# Patient Record
Sex: MALE | Race: BLACK OR AFRICAN AMERICAN | NOT HISPANIC OR LATINO
[De-identification: names, ages, dates, MRNs, and addresses within clinical notes are randomized per-mention and may not be internally consistent; named-entity substitution may affect disease eponyms.]

---

## 2017-02-03 ENCOUNTER — MEDICATION RENEWAL (OUTPATIENT)
Age: 62
End: 2017-02-03

## 2017-02-08 ENCOUNTER — APPOINTMENT (OUTPATIENT)
Dept: ENDOCRINOLOGY | Facility: CLINIC | Age: 62
End: 2017-02-08

## 2017-02-08 VITALS
DIASTOLIC BLOOD PRESSURE: 80 MMHG | HEIGHT: 70 IN | OXYGEN SATURATION: 98 % | SYSTOLIC BLOOD PRESSURE: 120 MMHG | HEART RATE: 69 BPM | WEIGHT: 218 LBS | BODY MASS INDEX: 31.21 KG/M2

## 2017-02-08 DIAGNOSIS — E11.65 TYPE 2 DIABETES MELLITUS WITH HYPERGLYCEMIA: ICD-10-CM

## 2017-02-08 LAB
GLUCOSE BLDC GLUCOMTR-MCNC: 104
HBA1C MFR BLD HPLC: 6.9

## 2017-05-17 ENCOUNTER — APPOINTMENT (OUTPATIENT)
Dept: ENDOCRINOLOGY | Facility: CLINIC | Age: 62
End: 2017-05-17
Payer: MEDICARE

## 2017-05-17 VITALS
HEART RATE: 76 BPM | DIASTOLIC BLOOD PRESSURE: 76 MMHG | SYSTOLIC BLOOD PRESSURE: 118 MMHG | HEIGHT: 70 IN | OXYGEN SATURATION: 97 % | WEIGHT: 224 LBS | BODY MASS INDEX: 32.07 KG/M2

## 2017-05-17 PROCEDURE — 99215 OFFICE O/P EST HI 40 MIN: CPT

## 2017-05-17 RX ORDER — CALCIUM CARBONATE 260MG(650)
500 TABLET,CHEWABLE ORAL
Refills: 0 | Status: ACTIVE | COMMUNITY

## 2017-05-19 LAB
CREAT SPEC-SCNC: 225 MG/DL
MICROALBUMIN 24H UR DL<=1MG/L-MCNC: 9.8 MG/DL
MICROALBUMIN/CREAT 24H UR-RTO: 44

## 2017-05-24 ENCOUNTER — MEDICATION RENEWAL (OUTPATIENT)
Age: 62
End: 2017-05-24

## 2017-06-02 ENCOUNTER — MEDICATION RENEWAL (OUTPATIENT)
Age: 62
End: 2017-06-02

## 2017-06-08 ENCOUNTER — MEDICATION RENEWAL (OUTPATIENT)
Age: 62
End: 2017-06-08

## 2017-10-25 ENCOUNTER — APPOINTMENT (OUTPATIENT)
Dept: ENDOCRINOLOGY | Facility: CLINIC | Age: 62
End: 2017-10-25
Payer: MEDICARE

## 2017-10-25 VITALS
SYSTOLIC BLOOD PRESSURE: 120 MMHG | DIASTOLIC BLOOD PRESSURE: 70 MMHG | WEIGHT: 227 LBS | BODY MASS INDEX: 32.5 KG/M2 | HEIGHT: 70 IN | OXYGEN SATURATION: 97 % | HEART RATE: 78 BPM

## 2017-10-25 LAB
GLUCOSE BLDC GLUCOMTR-MCNC: 116
HBA1C MFR BLD HPLC: 6.9

## 2017-10-25 PROCEDURE — 82962 GLUCOSE BLOOD TEST: CPT

## 2017-10-25 PROCEDURE — 99215 OFFICE O/P EST HI 40 MIN: CPT | Mod: 25

## 2017-10-25 PROCEDURE — 83036 HEMOGLOBIN GLYCOSYLATED A1C: CPT | Mod: QW

## 2017-10-25 RX ORDER — BLOOD-GLUCOSE METER
W/DEVICE KIT MISCELLANEOUS
Qty: 1 | Refills: 0 | Status: ACTIVE | COMMUNITY
Start: 2017-10-25 | End: 1900-01-01

## 2017-10-26 LAB
CREAT SPEC-SCNC: 164 MG/DL
MICROALBUMIN 24H UR DL<=1MG/L-MCNC: 4.5 MG/DL
MICROALBUMIN/CREAT 24H UR-RTO: 27 MG/G

## 2017-11-16 ENCOUNTER — CLINICAL ADVICE (OUTPATIENT)
Age: 62
End: 2017-11-16

## 2017-11-29 ENCOUNTER — CLINICAL ADVICE (OUTPATIENT)
Age: 62
End: 2017-11-29

## 2017-12-05 ENCOUNTER — CLINICAL ADVICE (OUTPATIENT)
Age: 62
End: 2017-12-05

## 2017-12-07 ENCOUNTER — EMERGENCY (EMERGENCY)
Facility: HOSPITAL | Age: 62
LOS: 1 days | Discharge: HOME | End: 2017-12-07
Attending: EMERGENCY MEDICINE | Admitting: EMERGENCY MEDICINE
Payer: MEDICARE

## 2017-12-07 VITALS
DIASTOLIC BLOOD PRESSURE: 78 MMHG | HEART RATE: 104 BPM | TEMPERATURE: 98 F | OXYGEN SATURATION: 100 % | RESPIRATION RATE: 16 BRPM | SYSTOLIC BLOOD PRESSURE: 107 MMHG

## 2017-12-07 DIAGNOSIS — Z85.528 PERSONAL HISTORY OF OTHER MALIGNANT NEOPLASM OF KIDNEY: Chronic | ICD-10-CM

## 2017-12-07 PROCEDURE — 99285 EMERGENCY DEPT VISIT HI MDM: CPT | Mod: 25,GC

## 2017-12-07 NOTE — ED ADULT TRIAGE NOTE - CHIEF COMPLAINT QUOTE
pt. w/ hx. MS and DM c/o dizziness , loss of appetite , diaphoretic , n/v since Friday. Wife states pt. has been on a new DM medication Vyduren 2 mg once a week since october. Family member states he has been feeling very uncomfortable since Friday. Pt. appears uncomfortable , fs in triage 178. pt. w/ hx. MS and DM c/o dizziness , loss of appetite , diaphoretic , n/v since Friday. Wife states pt. has been on a new DM medication Vyduren 2 mg once a week since october. Family member states he has been feeling very uncomfortable since Friday. Pt. appears uncomfortable , fs in triage 178. Unable to obtain an oral temp, pt. feels cold to touch , very diaphoretic. 98.3 axillary in triage.

## 2017-12-08 VITALS
TEMPERATURE: 99 F | OXYGEN SATURATION: 100 % | SYSTOLIC BLOOD PRESSURE: 111 MMHG | DIASTOLIC BLOOD PRESSURE: 87 MMHG | HEART RATE: 86 BPM | RESPIRATION RATE: 16 BRPM

## 2017-12-08 LAB
ALBUMIN SERPL ELPH-MCNC: 3.5 G/DL — SIGNIFICANT CHANGE UP (ref 3.3–5)
ALBUMIN SERPL ELPH-MCNC: 4.1 G/DL — SIGNIFICANT CHANGE UP (ref 3.3–5)
ALP SERPL-CCNC: 47 U/L — SIGNIFICANT CHANGE UP (ref 40–120)
ALP SERPL-CCNC: 62 U/L — SIGNIFICANT CHANGE UP (ref 40–120)
ALT FLD-CCNC: 8 U/L — SIGNIFICANT CHANGE UP (ref 4–41)
ALT FLD-CCNC: 8 U/L — SIGNIFICANT CHANGE UP (ref 4–41)
ANISOCYTOSIS BLD QL: SLIGHT — SIGNIFICANT CHANGE UP
APPEARANCE UR: CLEAR — SIGNIFICANT CHANGE UP
AST SERPL-CCNC: 11 U/L — SIGNIFICANT CHANGE UP (ref 4–40)
AST SERPL-CCNC: 7 U/L — SIGNIFICANT CHANGE UP (ref 4–40)
B-OH-BUTYR SERPL-SCNC: 1.7 MMOL/L — HIGH (ref 0–0.4)
BASE EXCESS BLDV CALC-SCNC: -3.8 MMOL/L — SIGNIFICANT CHANGE UP
BASE EXCESS BLDV CALC-SCNC: 3 MMOL/L — SIGNIFICANT CHANGE UP
BASOPHILS # BLD AUTO: 0.03 K/UL — SIGNIFICANT CHANGE UP (ref 0–0.2)
BASOPHILS # BLD AUTO: 0.17 K/UL — SIGNIFICANT CHANGE UP (ref 0–0.2)
BASOPHILS NFR BLD AUTO: 0.1 % — SIGNIFICANT CHANGE UP (ref 0–2)
BASOPHILS NFR BLD AUTO: 0.9 % — SIGNIFICANT CHANGE UP (ref 0–2)
BASOPHILS NFR SPEC: 0 % — SIGNIFICANT CHANGE UP (ref 0–2)
BILIRUB SERPL-MCNC: 0.5 MG/DL — SIGNIFICANT CHANGE UP (ref 0.2–1.2)
BILIRUB SERPL-MCNC: 0.7 MG/DL — SIGNIFICANT CHANGE UP (ref 0.2–1.2)
BILIRUB UR-MCNC: NEGATIVE — SIGNIFICANT CHANGE UP
BLASTS # FLD: 0 % — SIGNIFICANT CHANGE UP (ref 0–0)
BLOOD GAS VENOUS - CREATININE: 1.09 MG/DL — SIGNIFICANT CHANGE UP (ref 0.5–1.3)
BLOOD GAS VENOUS - CREATININE: 1.64 MG/DL — HIGH (ref 0.5–1.3)
BLOOD UR QL VISUAL: NEGATIVE — SIGNIFICANT CHANGE UP
BUN SERPL-MCNC: 24 MG/DL — HIGH (ref 7–23)
BUN SERPL-MCNC: 27 MG/DL — HIGH (ref 7–23)
CALCIUM SERPL-MCNC: 7.6 MG/DL — LOW (ref 8.4–10.5)
CALCIUM SERPL-MCNC: 9.3 MG/DL — SIGNIFICANT CHANGE UP (ref 8.4–10.5)
CHLORIDE BLDV-SCNC: 106 MMOL/L — SIGNIFICANT CHANGE UP (ref 96–108)
CHLORIDE BLDV-SCNC: 97 MMOL/L — SIGNIFICANT CHANGE UP (ref 96–108)
CHLORIDE SERPL-SCNC: 104 MMOL/L — SIGNIFICANT CHANGE UP (ref 98–107)
CHLORIDE SERPL-SCNC: 92 MMOL/L — LOW (ref 98–107)
CO2 SERPL-SCNC: 20 MMOL/L — LOW (ref 22–31)
CO2 SERPL-SCNC: 27 MMOL/L — SIGNIFICANT CHANGE UP (ref 22–31)
COLOR SPEC: SIGNIFICANT CHANGE UP
CREAT SERPL-MCNC: 1.15 MG/DL — SIGNIFICANT CHANGE UP (ref 0.5–1.3)
CREAT SERPL-MCNC: 1.6 MG/DL — HIGH (ref 0.5–1.3)
EOSINOPHIL # BLD AUTO: 1.18 K/UL — HIGH (ref 0–0.5)
EOSINOPHIL # BLD AUTO: 1.89 K/UL — HIGH (ref 0–0.5)
EOSINOPHIL NFR BLD AUTO: 5.8 % — SIGNIFICANT CHANGE UP (ref 0–6)
EOSINOPHIL NFR BLD AUTO: 9.9 % — HIGH (ref 0–6)
EOSINOPHIL NFR FLD: 1.8 % — SIGNIFICANT CHANGE UP (ref 0–6)
GAS PNL BLDV: 133 MMOL/L — LOW (ref 136–146)
GAS PNL BLDV: 133 MMOL/L — LOW (ref 136–146)
GIANT PLATELETS BLD QL SMEAR: PRESENT — SIGNIFICANT CHANGE UP
GLUCOSE BLDV-MCNC: 125 — HIGH (ref 70–99)
GLUCOSE BLDV-MCNC: 193 — HIGH (ref 70–99)
GLUCOSE SERPL-MCNC: 122 MG/DL — HIGH (ref 70–99)
GLUCOSE SERPL-MCNC: 178 MG/DL — HIGH (ref 70–99)
GLUCOSE UR-MCNC: NEGATIVE — SIGNIFICANT CHANGE UP
HCO3 BLDV-SCNC: 21 MMOL/L — SIGNIFICANT CHANGE UP (ref 20–27)
HCO3 BLDV-SCNC: 24 MMOL/L — SIGNIFICANT CHANGE UP (ref 20–27)
HCT VFR BLD CALC: 45.3 % — SIGNIFICANT CHANGE UP (ref 39–50)
HCT VFR BLD CALC: 54.9 % — HIGH (ref 39–50)
HCT VFR BLDV CALC: 50.8 % — SIGNIFICANT CHANGE UP (ref 39–51)
HCT VFR BLDV CALC: 60.6 % — CRITICAL HIGH (ref 39–51)
HGB BLD-MCNC: 16.3 G/DL — SIGNIFICANT CHANGE UP (ref 13–17)
HGB BLD-MCNC: 19.3 G/DL — CRITICAL HIGH (ref 13–17)
HGB BLDV-MCNC: 16.6 G/DL — SIGNIFICANT CHANGE UP (ref 13–17)
HGB BLDV-MCNC: 19.9 G/DL — CRITICAL HIGH (ref 13–17)
HYALINE CASTS # UR AUTO: SIGNIFICANT CHANGE UP (ref 0–?)
IMM GRANULOCYTES # BLD AUTO: 1.03 # — SIGNIFICANT CHANGE UP
IMM GRANULOCYTES # BLD AUTO: 1.13 # — SIGNIFICANT CHANGE UP
IMM GRANULOCYTES NFR BLD AUTO: 5.4 % — HIGH (ref 0–1.5)
IMM GRANULOCYTES NFR BLD AUTO: 5.5 % — HIGH (ref 0–1.5)
KETONES UR-MCNC: HIGH
LACTATE BLDV-MCNC: 1.2 MMOL/L — SIGNIFICANT CHANGE UP (ref 0.5–2)
LACTATE BLDV-MCNC: 3.2 MMOL/L — HIGH (ref 0.5–2)
LACTATE SERPL-SCNC: 1.8 MMOL/L — SIGNIFICANT CHANGE UP (ref 0.5–2)
LEUKOCYTE ESTERASE UR-ACNC: NEGATIVE — SIGNIFICANT CHANGE UP
LIDOCAIN IGE QN: 18.3 U/L — SIGNIFICANT CHANGE UP (ref 7–60)
LYMPHOCYTES # BLD AUTO: 1.98 K/UL — SIGNIFICANT CHANGE UP (ref 1–3.3)
LYMPHOCYTES # BLD AUTO: 10.4 % — LOW (ref 13–44)
LYMPHOCYTES # BLD AUTO: 11.6 % — LOW (ref 13–44)
LYMPHOCYTES # BLD AUTO: 2.37 K/UL — SIGNIFICANT CHANGE UP (ref 1–3.3)
LYMPHOCYTES NFR SPEC AUTO: 9.1 % — LOW (ref 13–44)
MCHC RBC-ENTMCNC: 26.8 PG — LOW (ref 27–34)
MCHC RBC-ENTMCNC: 27.5 PG — SIGNIFICANT CHANGE UP (ref 27–34)
MCHC RBC-ENTMCNC: 35.2 % — SIGNIFICANT CHANGE UP (ref 32–36)
MCHC RBC-ENTMCNC: 36 % — SIGNIFICANT CHANGE UP (ref 32–36)
MCV RBC AUTO: 76.4 FL — LOW (ref 80–100)
MCV RBC AUTO: 76.4 FL — LOW (ref 80–100)
METAMYELOCYTES # FLD: 0 % — SIGNIFICANT CHANGE UP (ref 0–1)
MICROCYTES BLD QL: SLIGHT — SIGNIFICANT CHANGE UP
MONOCYTES # BLD AUTO: 1.84 K/UL — HIGH (ref 0–0.9)
MONOCYTES # BLD AUTO: 2.11 K/UL — HIGH (ref 0–0.9)
MONOCYTES NFR BLD AUTO: 11.1 % — SIGNIFICANT CHANGE UP (ref 2–14)
MONOCYTES NFR BLD AUTO: 9 % — SIGNIFICANT CHANGE UP (ref 2–14)
MONOCYTES NFR BLD: 4.5 % — SIGNIFICANT CHANGE UP (ref 2–9)
MUCOUS THREADS # UR AUTO: SIGNIFICANT CHANGE UP
MYELOCYTES NFR BLD: 0 % — SIGNIFICANT CHANGE UP (ref 0–0)
NEUTROPHIL AB SER-ACNC: 68.2 % — SIGNIFICANT CHANGE UP (ref 43–77)
NEUTROPHILS # BLD AUTO: 11.91 K/UL — HIGH (ref 1.8–7.4)
NEUTROPHILS # BLD AUTO: 13.85 K/UL — HIGH (ref 1.8–7.4)
NEUTROPHILS NFR BLD AUTO: 62.3 % — SIGNIFICANT CHANGE UP (ref 43–77)
NEUTROPHILS NFR BLD AUTO: 68 % — SIGNIFICANT CHANGE UP (ref 43–77)
NEUTS BAND # BLD: 14.6 % — HIGH (ref 0–6)
NITRITE UR-MCNC: NEGATIVE — SIGNIFICANT CHANGE UP
NON-SQ EPI CELLS # UR AUTO: <1 — SIGNIFICANT CHANGE UP
NRBC # FLD: 0 — SIGNIFICANT CHANGE UP
NRBC # FLD: 0 — SIGNIFICANT CHANGE UP
OTHER - HEMATOLOGY %: 0 — SIGNIFICANT CHANGE UP
PCO2 BLDV: 34 MMHG — LOW (ref 41–51)
PCO2 BLDV: 47 MMHG — SIGNIFICANT CHANGE UP (ref 41–51)
PH BLDV: 7.39 PH — SIGNIFICANT CHANGE UP (ref 7.32–7.43)
PH BLDV: 7.4 PH — SIGNIFICANT CHANGE UP (ref 7.32–7.43)
PH UR: 5.5 — SIGNIFICANT CHANGE UP (ref 4.6–8)
PLATELET # BLD AUTO: 272 K/UL — SIGNIFICANT CHANGE UP (ref 150–400)
PLATELET # BLD AUTO: 344 K/UL — SIGNIFICANT CHANGE UP (ref 150–400)
PLATELET COUNT - ESTIMATE: NORMAL — SIGNIFICANT CHANGE UP
PMV BLD: 10.1 FL — SIGNIFICANT CHANGE UP (ref 7–13)
PMV BLD: 9.9 FL — SIGNIFICANT CHANGE UP (ref 7–13)
PO2 BLDV: 52 MMHG — HIGH (ref 35–40)
PO2 BLDV: < 24 MMHG — LOW (ref 35–40)
POLYCHROMASIA BLD QL SMEAR: SIGNIFICANT CHANGE UP
POTASSIUM BLDV-SCNC: 3.9 MMOL/L — SIGNIFICANT CHANGE UP (ref 3.4–4.5)
POTASSIUM BLDV-SCNC: 4.3 MMOL/L — SIGNIFICANT CHANGE UP (ref 3.4–4.5)
POTASSIUM SERPL-MCNC: 4.1 MMOL/L — SIGNIFICANT CHANGE UP (ref 3.5–5.3)
POTASSIUM SERPL-MCNC: 4.7 MMOL/L — SIGNIFICANT CHANGE UP (ref 3.5–5.3)
POTASSIUM SERPL-SCNC: 4.1 MMOL/L — SIGNIFICANT CHANGE UP (ref 3.5–5.3)
POTASSIUM SERPL-SCNC: 4.7 MMOL/L — SIGNIFICANT CHANGE UP (ref 3.5–5.3)
PROMYELOCYTES # FLD: 0 % — SIGNIFICANT CHANGE UP (ref 0–0)
PROT SERPL-MCNC: 6 G/DL — SIGNIFICANT CHANGE UP (ref 6–8.3)
PROT SERPL-MCNC: 7.4 G/DL — SIGNIFICANT CHANGE UP (ref 6–8.3)
PROT UR-MCNC: 30 MG/DL — HIGH
RBC # BLD: 5.93 M/UL — HIGH (ref 4.2–5.8)
RBC # BLD: 7.19 M/UL — HIGH (ref 4.2–5.8)
RBC # FLD: 15.1 % — HIGH (ref 10.3–14.5)
RBC # FLD: 17.1 % — HIGH (ref 10.3–14.5)
RBC CASTS # UR COMP ASSIST: SIGNIFICANT CHANGE UP (ref 0–?)
SAO2 % BLDV: 15.5 % — LOW (ref 60–85)
SAO2 % BLDV: 84.4 % — SIGNIFICANT CHANGE UP (ref 60–85)
SODIUM SERPL-SCNC: 135 MMOL/L — SIGNIFICANT CHANGE UP (ref 135–145)
SODIUM SERPL-SCNC: 138 MMOL/L — SIGNIFICANT CHANGE UP (ref 135–145)
SP GR SPEC: 1.03 — SIGNIFICANT CHANGE UP (ref 1–1.04)
SQUAMOUS # UR AUTO: SIGNIFICANT CHANGE UP
UROBILINOGEN FLD QL: NORMAL MG/DL — SIGNIFICANT CHANGE UP
VARIANT LYMPHS # BLD: 1.8 % — SIGNIFICANT CHANGE UP
WBC # BLD: 19.09 K/UL — HIGH (ref 3.8–10.5)
WBC # BLD: 20.4 K/UL — HIGH (ref 3.8–10.5)
WBC # FLD AUTO: 19.09 K/UL — HIGH (ref 3.8–10.5)
WBC # FLD AUTO: 20.4 K/UL — HIGH (ref 3.8–10.5)
WBC UR QL: HIGH (ref 0–?)

## 2017-12-08 PROCEDURE — 71020: CPT | Mod: 26

## 2017-12-08 PROCEDURE — 99218: CPT

## 2017-12-08 RX ORDER — BACLOFEN 100 %
10 POWDER (GRAM) MISCELLANEOUS
Qty: 0 | Refills: 0 | Status: DISCONTINUED | OUTPATIENT
Start: 2017-12-08 | End: 2017-12-11

## 2017-12-08 RX ORDER — SODIUM CHLORIDE 9 MG/ML
1000 INJECTION INTRAMUSCULAR; INTRAVENOUS; SUBCUTANEOUS ONCE
Qty: 0 | Refills: 0 | Status: COMPLETED | OUTPATIENT
Start: 2017-12-08 | End: 2017-12-08

## 2017-12-08 RX ORDER — SODIUM CHLORIDE 9 MG/ML
2000 INJECTION INTRAMUSCULAR; INTRAVENOUS; SUBCUTANEOUS ONCE
Qty: 0 | Refills: 0 | Status: COMPLETED | OUTPATIENT
Start: 2017-12-08 | End: 2017-12-08

## 2017-12-08 RX ORDER — SODIUM CHLORIDE 9 MG/ML
1000 INJECTION INTRAMUSCULAR; INTRAVENOUS; SUBCUTANEOUS
Qty: 0 | Refills: 0 | Status: DISCONTINUED | OUTPATIENT
Start: 2017-12-08 | End: 2017-12-11

## 2017-12-08 RX ORDER — FAMOTIDINE 10 MG/ML
20 INJECTION INTRAVENOUS ONCE
Qty: 0 | Refills: 0 | Status: COMPLETED | OUTPATIENT
Start: 2017-12-08 | End: 2017-12-08

## 2017-12-08 RX ORDER — LEVOTHYROXINE SODIUM 125 MCG
100 TABLET ORAL DAILY
Qty: 0 | Refills: 0 | Status: DISCONTINUED | OUTPATIENT
Start: 2017-12-08 | End: 2017-12-11

## 2017-12-08 RX ORDER — LOPERAMIDE HCL 2 MG
2 TABLET ORAL ONCE
Qty: 0 | Refills: 0 | Status: COMPLETED | OUTPATIENT
Start: 2017-12-08 | End: 2017-12-08

## 2017-12-08 RX ORDER — ONDANSETRON 8 MG/1
4 TABLET, FILM COATED ORAL ONCE
Qty: 0 | Refills: 0 | Status: COMPLETED | OUTPATIENT
Start: 2017-12-08 | End: 2017-12-08

## 2017-12-08 RX ORDER — VALSARTAN 80 MG/1
160 TABLET ORAL DAILY
Qty: 0 | Refills: 0 | Status: DISCONTINUED | OUTPATIENT
Start: 2017-12-08 | End: 2017-12-11

## 2017-12-08 RX ADMIN — Medication 10 MILLIGRAM(S): at 07:44

## 2017-12-08 RX ADMIN — SODIUM CHLORIDE 150 MILLILITER(S): 9 INJECTION INTRAMUSCULAR; INTRAVENOUS; SUBCUTANEOUS at 06:26

## 2017-12-08 RX ADMIN — Medication 100 MICROGRAM(S): at 07:43

## 2017-12-08 RX ADMIN — Medication 30 MILLILITER(S): at 03:11

## 2017-12-08 RX ADMIN — SODIUM CHLORIDE 1000 MILLILITER(S): 9 INJECTION INTRAMUSCULAR; INTRAVENOUS; SUBCUTANEOUS at 03:11

## 2017-12-08 RX ADMIN — FAMOTIDINE 20 MILLIGRAM(S): 10 INJECTION INTRAVENOUS at 03:11

## 2017-12-08 RX ADMIN — Medication 2 MILLIGRAM(S): at 10:45

## 2017-12-08 RX ADMIN — ONDANSETRON 4 MILLIGRAM(S): 8 TABLET, FILM COATED ORAL at 00:43

## 2017-12-08 RX ADMIN — VALSARTAN 160 MILLIGRAM(S): 80 TABLET ORAL at 07:43

## 2017-12-08 RX ADMIN — SODIUM CHLORIDE 1000 MILLILITER(S): 9 INJECTION INTRAMUSCULAR; INTRAVENOUS; SUBCUTANEOUS at 00:43

## 2017-12-08 NOTE — ED CDU PROVIDER INITIAL DAY NOTE - PROGRESS NOTE DETAILS
Pt doing well, feels much better, hemoconcentration improved. Pt had dc'ed his hypoglcemics due to vomiting, diarrhea and dec po intake. Will d/w Dr. Sanchez regarding restarting meds and then dc pt. Spoke with Dr. Sanchez from Endocrinology. Pt can be dc still holding PO meds and insulin. Pt to resume normal diabetic diet, when sugar starts to increase above 150 pt can call the office for follow up appointment. stable for dc

## 2017-12-08 NOTE — ED PROVIDER NOTE - PROGRESS NOTE DETAILS
Labs show likely hemoconcentration on CBC, no anion gap, + BHB, no acidosis, ketosis likely secondary to N/V/D. Pt feeling better following IV hydration and supportive medications, will observe for further treatment

## 2017-12-08 NOTE — ED CDU PROVIDER INITIAL DAY NOTE - OBJECTIVE STATEMENT
ED PROVIDER HPI: "62M h/o HTN, HLD, renal cell carcinoma, s/p resection, hypothyroid and MS presenting with diaphoresis.  Symptoms started 6 days ago, notes diaphoresis, chills, decreased appetite, nausea, vomiting (2-3x/day), diarrhea (watery, every other hour), periumbilical abd pain & distension. Tolerating fluids at home. Has not been taking insulin at home.  Endo: Rama Sanchez  PMD: Baskharoun  Meds Bydureon 2mg weekly, novolog 0-10-12, lantus 48u"    CDU LEXII Betancur: Agree with above history. Pt is a 62yM w/pmhx HTN, HLD, renal cell carcimona s/p resection, hypothyroid and MS presented to the ED with 6 days of diaphoresis, chills, abdominal pain, nausea, vomiting and diarrhea. Pt states 6 weeks ago he started a new DM medication, Bydureon, when symptoms started he saw his endocrinologist who told him to stop taking all of his DM medications. In the ED pt found to have blood sugar 168, normal bicarb, no gap, +beta-hydroxybutyrate, dx likely gastroenteritis.

## 2017-12-08 NOTE — ED ADULT NURSE NOTE - CHIEF COMPLAINT QUOTE
pt. w/ hx. MS and DM c/o dizziness , loss of appetite , diaphoretic , n/v since Friday. Wife states pt. has been on a new DM medication Vyduren 2 mg once a week since october. Family member states he has been feeling very uncomfortable since Friday. Pt. appears uncomfortable , fs in triage 178. Unable to obtain an oral temp, pt. feels cold to touch , very diaphoretic. 98.3 axillary in triage.

## 2017-12-08 NOTE — ED PROVIDER NOTE - PMH
Hyperlipidemia    Hypertension    Multiple sclerosis    Renal cell carcinoma of both kidneys    Scleritis, unspecified laterality

## 2017-12-08 NOTE — ED CDU PROVIDER DISPOSITION NOTE - ATTENDING CONTRIBUTION TO CARE
Dr. Ramirez: This H&P has been written by myself in its entirety  Dr. Ramirez: I performed a face to face bedside interview with patient regarding history of present illness, review of symptoms and past medical history. I completed an independent physical exam.  I have discussed patient's plan of care with PA.   I agree with note as stated above, having amended the EMR as needed to reflect my findings.   This includes HISTORY OF PRESENT ILLNESS, HIV, PAST MEDICAL/SURGICAL/FAMILY/SOCIAL HISTORY, ALLERGIES AND HOME MEDICATIONS, REVIEW OF SYSTEMS, PHYSICAL EXAM, and any PROGRESS NOTES during the time I functioned as the attending physician for this patient.

## 2017-12-08 NOTE — ED PROVIDER NOTE - ATTENDING CONTRIBUTION TO CARE
61 y/o M with h/o HTN, HLD, renal cell carcinoma s/p resection, hypothyroid, MS, DM here with 1 week of n/v/d, now with generalized weakness, diaphoresis.  Pt states that for the past week he has been having multiple (2-3 episodes) of nbnb vomiting and watery nonbloody diarrhea, accompanied by 61 y/o M with h/o HTN, HLD, renal cell carcinoma s/p resection, hypothyroid, MS, DM here with 1 week of n/v/d, now with generalized weakness, diaphoresis.  Pt states that for the past week he has been having multiple (2-3 episodes) of nbnb vomiting and watery nonbloody diarrhea, accompanied by poor appetite and occasional crampy abd pain.  No recent travel, hospitalizations, known sick contacts, abx use.  Pt states he thinks it's due to his diabetes medication (bydureon, started 7 weeks prior) and spoke to his endocrinologist Dr Adams, who told him to stop all his medications.  Pt cont to have sxs.  No fever, cp, back pain, sob, cough, urinary sxs, rash.  Well appearing, lying comfortably in stretcher, awake and alert, nontoxic.  VSS.  NCAT, dry mucous membranes.  Lungs cta bl.  Cards nl S1/S2, RRR, no MRG.  Abd soft ntnd with active bowel sounds.  No pedal edema or calf tenderness.  Concern for viral AGE vs med reaction vs hyperglycemia.  BHB elevated on labs, but pt without acidosis, normal AG, normal bicarb.  Labs reveal hemoconcentration, pt feeling better after IV hydration, will obs in CDU for cont hydration, repeat labs, endo eval regarding med regimen.

## 2017-12-08 NOTE — ED CDU PROVIDER INITIAL DAY NOTE - MEDICAL DECISION MAKING DETAILS
62yM w/pmhx HTN, HLD, renal cell carcinoma s/p resection, hypothyroid and MS presented to the ED with 6 days of abdominal discomfort, diaphoresis w/ vomiting and diarrhea. In the ED pt was given 2L, in CDU for repeat labs, IV hydration and endocrine consult

## 2017-12-08 NOTE — ED PROVIDER NOTE - OBJECTIVE STATEMENT
62M h/o HTN, HLD, renal cell carcinoma, s/p resection, hypothyroid and MS presenting with diaphoresis.  Symptoms started 6 days ago, notes diaphoresis, chills, decreased appetite, nausea, vomiting (2-3x/day), diarrhea (watery, every other hour), periumbilical abd pain & distension. Tolerating fluids at home. Has not been taking insulin at home.    Endo: Rani Parikh Bydureon 2mg weekly, novolog 5-12-14, lantus 48u 62M h/o HTN, HLD, renal cell carcinoma, s/p resection, hypothyroid and MS presenting with diaphoresis.  Symptoms started 6 days ago, notes diaphoresis, chills, decreased appetite, nausea, vomiting (2-3x/day), diarrhea (watery, every other hour), periumbilical abd pain & distension. Tolerating fluids at home. Has not been taking insulin at home.    Endo: Rama Sanchez  PMD: Baskharoun  Meds Bydureon 2mg weekly, novolog 5-12-14, lantus 48u 62M h/o HTN, HLD, renal cell carcinoma, s/p resection, hypothyroid and MS presenting with diaphoresis.  Symptoms started 6 days ago, notes diaphoresis, chills, decreased appetite, nausea, vomiting (2-3x/day), diarrhea (watery, every other hour), periumbilical abd pain & distension. Tolerating fluids at home. Has not been taking insulin at home.    Endo: Rama Sanchez  PMD: Baskharoun  Meds Bydureon 2mg weekly, novolog 0-10-12, lantus 48u

## 2017-12-08 NOTE — ED CDU PROVIDER INITIAL DAY NOTE - ATTENDING CONTRIBUTION TO CARE
Thomas Thomas CARPENTER: 63 y/o M with h/o HTN, HLD, renal cell carcinoma s/p resection, hypothyroid, MS, DM here with 1 week of n/v/d, now with generalized weakness, diaphoresis.  Pt states that for the past week he has been having multiple (2-3 episodes) of nbnb vomiting and watery nonbloody diarrhea, accompanied by poor appetite and occasional crampy abd pain.  No recent travel, hospitalizations, known sick contacts, abx use.  Sxs suspected 2/2 medication regimen.  Endocrinologist (Dr Adams) DC'd all diabetic meds.    Well appearing, lying comfortably in stretcher, awake and alert, nontoxic.  VSS.  NCAT, dry mucous membranes.  Lungs cta bl.  Cards nl S1/S2, RRR, no MRG.  Abd soft ntnd with active bowel sounds.  No pedal edema or calf tenderness.      Pt dehydrated, labs reveal hemoconcentration.  CDU for cont hydration, repeat labs, endo eval regarding med regimen.

## 2017-12-08 NOTE — ED PROVIDER NOTE - MEDICAL DECISION MAKING DETAILS
62M w/ above history p/w chills, diaphoresis, N/V/D, abd pain, decreased appetite, concerning for DKA vs electrolyte abn vs underlying infxn  -labs, fluids, supportive tx

## 2017-12-09 LAB
BACTERIA UR CULT: SIGNIFICANT CHANGE UP
SPECIMEN SOURCE: SIGNIFICANT CHANGE UP

## 2018-01-17 ENCOUNTER — APPOINTMENT (OUTPATIENT)
Dept: ENDOCRINOLOGY | Facility: CLINIC | Age: 63
End: 2018-01-17
Payer: MEDICARE

## 2018-01-17 VITALS
DIASTOLIC BLOOD PRESSURE: 80 MMHG | HEIGHT: 70 IN | BODY MASS INDEX: 31.07 KG/M2 | OXYGEN SATURATION: 98 % | HEART RATE: 88 BPM | WEIGHT: 217 LBS | SYSTOLIC BLOOD PRESSURE: 120 MMHG

## 2018-01-17 LAB
GLUCOSE BLDC GLUCOMTR-MCNC: 102
HBA1C MFR BLD HPLC: 5.9

## 2018-01-17 PROCEDURE — 82962 GLUCOSE BLOOD TEST: CPT

## 2018-01-17 PROCEDURE — 99214 OFFICE O/P EST MOD 30 MIN: CPT | Mod: 25

## 2018-01-17 PROCEDURE — 83036 HEMOGLOBIN GLYCOSYLATED A1C: CPT | Mod: QW

## 2018-01-18 RX ORDER — EXENATIDE 2 MG/.65ML
2 INJECTION, SUSPENSION, EXTENDED RELEASE SUBCUTANEOUS
Qty: 12 | Refills: 3 | Status: DISCONTINUED | COMMUNITY
Start: 2017-05-17 | End: 2018-01-18

## 2018-03-08 ENCOUNTER — CLINICAL ADVICE (OUTPATIENT)
Age: 63
End: 2018-03-08

## 2018-05-06 ENCOUNTER — RX RENEWAL (OUTPATIENT)
Age: 63
End: 2018-05-06

## 2018-06-06 ENCOUNTER — APPOINTMENT (OUTPATIENT)
Dept: ENDOCRINOLOGY | Facility: CLINIC | Age: 63
End: 2018-06-06

## 2018-07-09 ENCOUNTER — RX RENEWAL (OUTPATIENT)
Age: 63
End: 2018-07-09

## 2018-07-27 RX ORDER — METFORMIN HYDROCHLORIDE 500 MG/1
500 TABLET, COATED ORAL
Qty: 30 | Refills: 5 | Status: DISCONTINUED | COMMUNITY
Start: 2018-03-08 | End: 2018-07-27

## 2018-08-24 ENCOUNTER — CLINICAL ADVICE (OUTPATIENT)
Age: 63
End: 2018-08-24

## 2018-09-18 ENCOUNTER — INBOUND DOCUMENT (OUTPATIENT)
Age: 63
End: 2018-09-18

## 2018-10-24 ENCOUNTER — APPOINTMENT (OUTPATIENT)
Dept: ENDOCRINOLOGY | Facility: CLINIC | Age: 63
End: 2018-10-24
Payer: MEDICARE

## 2018-10-24 VITALS
OXYGEN SATURATION: 98 % | WEIGHT: 219 LBS | DIASTOLIC BLOOD PRESSURE: 70 MMHG | HEART RATE: 73 BPM | SYSTOLIC BLOOD PRESSURE: 110 MMHG | HEIGHT: 70 IN | BODY MASS INDEX: 31.35 KG/M2

## 2018-10-24 DIAGNOSIS — E11.9 TYPE 2 DIABETES MELLITUS W/OUT COMPLICATIONS: ICD-10-CM

## 2018-10-24 LAB
GLUCOSE BLDC GLUCOMTR-MCNC: 224
HBA1C MFR BLD HPLC: 8.6

## 2018-10-24 PROCEDURE — 83036 HEMOGLOBIN GLYCOSYLATED A1C: CPT | Mod: QW

## 2018-10-24 PROCEDURE — 82962 GLUCOSE BLOOD TEST: CPT

## 2018-10-24 PROCEDURE — 99215 OFFICE O/P EST HI 40 MIN: CPT | Mod: 25

## 2018-10-25 PROBLEM — E11.9 CONTROLLED DIABETES MELLITUS: Noted: 2017-02-08

## 2018-10-25 LAB
CREAT SPEC-SCNC: 208 MG/DL
MICROALBUMIN 24H UR DL<=1MG/L-MCNC: 8.5 MG/DL
MICROALBUMIN/CREAT 24H UR-RTO: 41 MG/G

## 2018-10-29 ENCOUNTER — MEDICATION RENEWAL (OUTPATIENT)
Age: 63
End: 2018-10-29

## 2018-12-04 ENCOUNTER — RX RENEWAL (OUTPATIENT)
Age: 63
End: 2018-12-04

## 2018-12-21 ENCOUNTER — CLINICAL ADVICE (OUTPATIENT)
Age: 63
End: 2018-12-21

## 2019-02-04 ENCOUNTER — RX RENEWAL (OUTPATIENT)
Age: 64
End: 2019-02-04

## 2019-02-13 ENCOUNTER — MEDICATION RENEWAL (OUTPATIENT)
Age: 64
End: 2019-02-13

## 2019-02-19 ENCOUNTER — INBOUND DOCUMENT (OUTPATIENT)
Age: 64
End: 2019-02-19

## 2019-03-04 ENCOUNTER — RX RENEWAL (OUTPATIENT)
Age: 64
End: 2019-03-04

## 2019-05-01 ENCOUNTER — APPOINTMENT (OUTPATIENT)
Dept: ENDOCRINOLOGY | Facility: CLINIC | Age: 64
End: 2019-05-01
Payer: MEDICARE

## 2019-05-01 VITALS
SYSTOLIC BLOOD PRESSURE: 106 MMHG | HEART RATE: 107 BPM | WEIGHT: 215 LBS | DIASTOLIC BLOOD PRESSURE: 72 MMHG | HEIGHT: 70 IN | BODY MASS INDEX: 30.78 KG/M2 | OXYGEN SATURATION: 92 %

## 2019-05-01 LAB — HBA1C MFR BLD HPLC: 6.8

## 2019-05-01 PROCEDURE — 83036 HEMOGLOBIN GLYCOSYLATED A1C: CPT | Mod: QW

## 2019-05-01 PROCEDURE — 99214 OFFICE O/P EST MOD 30 MIN: CPT | Mod: 25

## 2019-05-01 RX ORDER — AMITRIPTYLINE HYDROCHLORIDE 50 MG/1
50 TABLET, FILM COATED ORAL
Refills: 0 | Status: ACTIVE | COMMUNITY
Start: 2019-03-04

## 2019-07-10 ENCOUNTER — RX RENEWAL (OUTPATIENT)
Age: 64
End: 2019-07-10

## 2019-10-04 ENCOUNTER — CLINICAL ADVICE (OUTPATIENT)
Age: 64
End: 2019-10-04

## 2019-10-07 ENCOUNTER — CLINICAL ADVICE (OUTPATIENT)
Age: 64
End: 2019-10-07

## 2019-10-11 ENCOUNTER — MEDICATION RENEWAL (OUTPATIENT)
Age: 64
End: 2019-10-11

## 2019-11-04 ENCOUNTER — CLINICAL ADVICE (OUTPATIENT)
Age: 64
End: 2019-11-04

## 2019-11-06 ENCOUNTER — APPOINTMENT (OUTPATIENT)
Dept: ENDOCRINOLOGY | Facility: CLINIC | Age: 64
End: 2019-11-06
Payer: MEDICARE

## 2019-11-06 VITALS
OXYGEN SATURATION: 98 % | WEIGHT: 211 LBS | BODY MASS INDEX: 30.21 KG/M2 | SYSTOLIC BLOOD PRESSURE: 122 MMHG | DIASTOLIC BLOOD PRESSURE: 80 MMHG | HEART RATE: 81 BPM | HEIGHT: 70 IN

## 2019-11-06 LAB
GLUCOSE BLDC GLUCOMTR-MCNC: 329
HBA1C MFR BLD HPLC: 11.4

## 2019-11-06 PROCEDURE — 82962 GLUCOSE BLOOD TEST: CPT

## 2019-11-06 PROCEDURE — 99215 OFFICE O/P EST HI 40 MIN: CPT | Mod: 25

## 2019-11-06 PROCEDURE — 83036 HEMOGLOBIN GLYCOSYLATED A1C: CPT | Mod: QW

## 2019-11-06 RX ORDER — METFORMIN ER 500 MG 500 MG/1
500 TABLET ORAL
Qty: 120 | Refills: 5 | Status: DISCONTINUED | COMMUNITY
Start: 2018-07-27 | End: 2019-11-06

## 2019-12-13 ENCOUNTER — CLINICAL ADVICE (OUTPATIENT)
Age: 64
End: 2019-12-13

## 2019-12-19 ENCOUNTER — APPOINTMENT (OUTPATIENT)
Dept: ENDOCRINOLOGY | Facility: CLINIC | Age: 64
End: 2019-12-19
Payer: MEDICARE

## 2019-12-19 VITALS — DIASTOLIC BLOOD PRESSURE: 80 MMHG | SYSTOLIC BLOOD PRESSURE: 122 MMHG

## 2019-12-19 VITALS — BODY MASS INDEX: 30.13 KG/M2 | WEIGHT: 210 LBS

## 2019-12-19 PROCEDURE — G0108 DIAB MANAGE TRN  PER INDIV: CPT

## 2019-12-20 ENCOUNTER — RX RENEWAL (OUTPATIENT)
Age: 64
End: 2019-12-20

## 2020-01-15 ENCOUNTER — APPOINTMENT (OUTPATIENT)
Dept: ENDOCRINOLOGY | Facility: CLINIC | Age: 65
End: 2020-01-15
Payer: MEDICARE

## 2020-01-15 VITALS
BODY MASS INDEX: 30.64 KG/M2 | HEART RATE: 72 BPM | OXYGEN SATURATION: 98 % | DIASTOLIC BLOOD PRESSURE: 70 MMHG | WEIGHT: 214 LBS | SYSTOLIC BLOOD PRESSURE: 115 MMHG | HEIGHT: 70 IN

## 2020-01-15 PROCEDURE — 99215 OFFICE O/P EST HI 40 MIN: CPT

## 2020-02-27 ENCOUNTER — APPOINTMENT (OUTPATIENT)
Dept: ENDOCRINOLOGY | Facility: CLINIC | Age: 65
End: 2020-02-27
Payer: MEDICARE

## 2020-02-27 PROCEDURE — G0108 DIAB MANAGE TRN  PER INDIV: CPT

## 2020-03-01 ENCOUNTER — RX RENEWAL (OUTPATIENT)
Age: 65
End: 2020-03-01

## 2020-04-29 ENCOUNTER — APPOINTMENT (OUTPATIENT)
Dept: ENDOCRINOLOGY | Facility: CLINIC | Age: 65
End: 2020-04-29
Payer: MEDICARE

## 2020-04-29 PROCEDURE — 99215 OFFICE O/P EST HI 40 MIN: CPT | Mod: 95

## 2020-05-18 ENCOUNTER — RX RENEWAL (OUTPATIENT)
Age: 65
End: 2020-05-18

## 2020-06-24 RX ORDER — OLMESARTAN MEDOXOMIL 40 MG/1
40 TABLET, FILM COATED ORAL
Qty: 90 | Refills: 0 | Status: DISCONTINUED | COMMUNITY
Start: 2019-03-06 | End: 2020-06-24

## 2020-11-11 ENCOUNTER — RX RENEWAL (OUTPATIENT)
Age: 65
End: 2020-11-11

## 2020-11-20 RX ORDER — PEN NEEDLE, DIABETIC 29 G X1/2"
32G X 4 MM NEEDLE, DISPOSABLE MISCELLANEOUS
Qty: 2 | Refills: 3 | Status: ACTIVE | COMMUNITY
Start: 2019-02-13 | End: 1900-01-01

## 2020-12-23 ENCOUNTER — NON-APPOINTMENT (OUTPATIENT)
Age: 65
End: 2020-12-23

## 2020-12-24 ENCOUNTER — RX RENEWAL (OUTPATIENT)
Age: 65
End: 2020-12-24

## 2020-12-26 ENCOUNTER — RX RENEWAL (OUTPATIENT)
Age: 65
End: 2020-12-26

## 2021-01-12 ENCOUNTER — RX RENEWAL (OUTPATIENT)
Age: 66
End: 2021-01-12

## 2021-05-25 ENCOUNTER — NON-APPOINTMENT (OUTPATIENT)
Age: 66
End: 2021-05-25

## 2021-06-10 ENCOUNTER — RX RENEWAL (OUTPATIENT)
Age: 66
End: 2021-06-10

## 2021-06-10 RX ORDER — IRON FUM,AG/C/B12/FOLIC/CA/SUC 151-60-1MG
50-101-1 TABLET ORAL DAILY
Qty: 90 | Refills: 0 | Status: ACTIVE | COMMUNITY
Start: 2020-04-29 | End: 1900-01-01

## 2021-06-16 ENCOUNTER — APPOINTMENT (OUTPATIENT)
Dept: ENDOCRINOLOGY | Facility: CLINIC | Age: 66
End: 2021-06-16
Payer: MEDICARE

## 2021-06-16 PROCEDURE — 99443: CPT | Mod: 95

## 2021-07-07 ENCOUNTER — APPOINTMENT (OUTPATIENT)
Dept: ENDOCRINOLOGY | Facility: CLINIC | Age: 66
End: 2021-07-07
Payer: MEDICARE

## 2021-07-07 VITALS
WEIGHT: 218 LBS | BODY MASS INDEX: 31.21 KG/M2 | TEMPERATURE: 98.1 F | DIASTOLIC BLOOD PRESSURE: 80 MMHG | HEART RATE: 61 BPM | HEIGHT: 70 IN | SYSTOLIC BLOOD PRESSURE: 120 MMHG | OXYGEN SATURATION: 98 %

## 2021-07-07 DIAGNOSIS — E11.65 TYPE 2 DIABETES MELLITUS WITH HYPERGLYCEMIA: ICD-10-CM

## 2021-07-07 PROCEDURE — 99215 OFFICE O/P EST HI 40 MIN: CPT

## 2021-07-07 RX ORDER — EZETIMIBE 10 MG/1
10 TABLET ORAL
Refills: 0 | Status: DISCONTINUED | COMMUNITY
End: 2021-07-07

## 2021-07-08 ENCOUNTER — NON-APPOINTMENT (OUTPATIENT)
Age: 66
End: 2021-07-08

## 2021-07-08 LAB
CREAT SPEC-SCNC: 125 MG/DL
MICROALBUMIN 24H UR DL<=1MG/L-MCNC: 5.8 MG/DL
MICROALBUMIN/CREAT 24H UR-RTO: 46 MG/G

## 2021-07-12 ENCOUNTER — NON-APPOINTMENT (OUTPATIENT)
Age: 66
End: 2021-07-12

## 2021-07-23 NOTE — ED CDU PROVIDER INITIAL DAY NOTE - GENITOURINARY NEGATIVE STATEMENT, MLM
no dysuria, no frequency, and no hematuria. Full Thickness Lip Wedge Repair (Flap) Text: Given the location of the defect and the proximity to free margins a full thickness wedge repair was deemed most appropriate.  Using a sterile surgical marker, the appropriate repair was drawn incorporating the defect and placing the expected incisions perpendicular to the vermilion border.  The vermilion border was also meticulously outlined to ensure appropriate reapproximation during the repair.  The area thus outlined was incised through and through with a #15 scalpel blade.  The muscularis and dermis were reaproximated with deep sutures following hemostasis. Care was taken to realign the vermilion border before proceeding with the superficial closure.  Once the vermilion was realigned the superfical and mucosal closure was finished.

## 2021-11-03 ENCOUNTER — APPOINTMENT (OUTPATIENT)
Dept: ENDOCRINOLOGY | Facility: CLINIC | Age: 66
End: 2021-11-03
Payer: MEDICARE

## 2021-11-03 VITALS
OXYGEN SATURATION: 98 % | BODY MASS INDEX: 29.99 KG/M2 | SYSTOLIC BLOOD PRESSURE: 120 MMHG | TEMPERATURE: 97.5 F | WEIGHT: 209 LBS | HEART RATE: 73 BPM | DIASTOLIC BLOOD PRESSURE: 80 MMHG

## 2021-11-03 LAB
GLUCOSE BLDC GLUCOMTR-MCNC: 115
HBA1C MFR BLD HPLC: 6.6

## 2021-11-03 PROCEDURE — 99214 OFFICE O/P EST MOD 30 MIN: CPT | Mod: 25,GC

## 2021-11-03 PROCEDURE — 82962 GLUCOSE BLOOD TEST: CPT | Mod: GC

## 2021-11-03 PROCEDURE — 83036 HEMOGLOBIN GLYCOSYLATED A1C: CPT | Mod: GC,QW

## 2021-11-03 RX ORDER — DIROXIMEL FUMARATE 231 MG/1
231 CAPSULE ORAL
Qty: 30 | Refills: 0 | Status: ACTIVE | COMMUNITY
Start: 2021-11-03

## 2021-11-03 NOTE — ASSESSMENT
[FreeTextEntry1] : 66M  DM2, hypothyroidism, HTN, HLD vit D deficiency presents for follow up.\par \par 1) DM2\par HbA1c 6.6% at goal\par Glucose log showing well controlled glucose with no hypoglycemia. \par Continue Levemir 48 units qhs\par Continue Novolog 10/10/8. \par Continue Jardiance 25mg daily\par f/u ophtho, podiatry, nephrology\par \par 2) Hypothyroidism\par TSH 2.7 wnl\par continue levothyroxine 100 mcg daily\par \par 3) Vit D deficiency - on ergocalciferol 50,000u 2x per month\par 25OHD 36 at goal\par  prior low BP. + microalbuminuria Oct 2018. Following with nephrology Dr. Amaya. \par Microalbumin/cr 46 in July 2021. Recommend continue Jardiance for renal protection.\par \par 5) HLD with elevated triglycerides - continue niacin and recommend lower carb diet. \par Clarify why not on statin at next visit\par also previously on zetia\par LDL 94\par Trig 333\par \par follow up 6 months\par \par Byron Cui DO, Endocrinology Fellow

## 2021-11-03 NOTE — END OF VISIT
[] : Fellow [FreeTextEntry3] : DM2 well controlled at this time. Has had many issues/pain regarding colorectal issues, pursuing treatment.\par Continue endocrine regimen at this time and as outlined above. [Time Spent: ___ minutes] : I have spent [unfilled] minutes of time on the encounter.

## 2021-11-03 NOTE — HISTORY OF PRESENT ILLNESS
[FreeTextEntry1] : 66M here for follow up of DM2. Also with MS and prior but not recent steroid use.\par He has been managed on basal bolus insulin + Jardiance.\par HbA1c 6.7% June 2021. A1c today 6.6%.\par \par Current regimen:\par Levemir 48 units qhs\par Novolog 10/10/8\par Jardiance 25mg daily \par \par No lows. No sxs of hyperglycemia.\par \par Previous intolerance to metformin (diarrhea) and GLP-1 (severe abd pain, nausea).\par Lost 9 lbs\par \par Glucose log reviewed:\par -159 with an occasional excursion aftering drinking prune juice\par prelunch 90s-129\par predinner 90s-137 with a 1x excursion to 167\par \par Ongoing issue with rectal fissures/pain requires procedures\par He followed up at [facility] and found out he has had recurrence of cancer lesions on his kidneys (history of partial bilateral nephrectomy retained 85% of left and retained 90% on the right). He was told it will just be monitored for now.\par Following with nephrology Dr. Amaya. Has f/u in Nov 2021.\par He is off Olmesartan since BP was lower side in the past. /80 today.\par up to date with ophtho, denies numbness/tingling in feet aside from chronic MS symptoms.\par \par Acquired hypothyroidism on levothyroxine 100 mcg. Reports taking this mid day with other meds.\par Vitamin D deficiency on ergocalciferol 50,000u 2 x per month\par \par Trig 333\par LDL 94\par Taking niacin 500mg daily\par Microalb/cr 46 in July 2021. On Jardiance.

## 2021-11-03 NOTE — PHYSICAL EXAM
[Alert] : alert [Well Nourished] : well nourished [No Acute Distress] : no acute distress [EOMI] : extra ocular movement intact [No Proptosis] : no proptosis [No Lid Lag] : no lid lag [Supple] : the neck was supple [Thyroid Not Enlarged] : the thyroid was not enlarged [No Thyroid Nodules] : no palpable thyroid nodules [No Respiratory Distress] : no respiratory distress [No Accessory Muscle Use] : no accessory muscle use [Normal Rate and Effort] : normal respiratory rate and effort [Normal Rate] : heart rate was normal [Regular Rhythm] : with a regular rhythm [No Edema] : no peripheral edema [Not Tender] : non-tender [Soft] : abdomen soft [No Involuntary Movements] : no involuntary movements were seen [No Joint Swelling] : no joint swelling seen [Right foot was examined, including] : right foot ~C was examined, including visual inspection with sensory and pulse exams [Left foot was examined, including] : left foot ~C was examined, including visual inspection with sensory and pulse exams [Normal] : normal [2+] : 2+ in the dorsalis pedis [No Motor Deficits] : the motor exam was normal [Oriented x3] : oriented to person, place, and time [Normal Affect] : the affect was normal [Normal Mood] : the mood was normal [Foot Ulcers] : no foot ulcers [#1 Diminished] : number 1 was normal [#2 Diminished] : number 2 was normal [#3 Diminished] : number 3 was normal [#4 Diminished] : number 4 was normal [#5 Diminished] : number 5 was normal [#6 Diminished] : number 6 was normal

## 2021-12-07 ENCOUNTER — RX RENEWAL (OUTPATIENT)
Age: 66
End: 2021-12-07

## 2022-01-18 ENCOUNTER — NON-APPOINTMENT (OUTPATIENT)
Age: 67
End: 2022-01-18

## 2022-01-19 ENCOUNTER — NON-APPOINTMENT (OUTPATIENT)
Age: 67
End: 2022-01-19

## 2022-02-18 ENCOUNTER — NON-APPOINTMENT (OUTPATIENT)
Age: 67
End: 2022-02-18

## 2022-02-24 ENCOUNTER — NON-APPOINTMENT (OUTPATIENT)
Age: 67
End: 2022-02-24

## 2022-03-07 ENCOUNTER — RX RENEWAL (OUTPATIENT)
Age: 67
End: 2022-03-07

## 2022-04-28 NOTE — ED CDU PROVIDER INITIAL DAY NOTE - GASTROINTESTINAL, MLM
Patient having chest pain took nitro no help.  Hx of heart attack and stents
Abdomen soft, non-tender, no rebound, no guarding.

## 2022-05-04 ENCOUNTER — NON-APPOINTMENT (OUTPATIENT)
Age: 67
End: 2022-05-04

## 2022-05-04 ENCOUNTER — APPOINTMENT (OUTPATIENT)
Dept: ENDOCRINOLOGY | Facility: CLINIC | Age: 67
End: 2022-05-04
Payer: MEDICARE

## 2022-05-04 VITALS
SYSTOLIC BLOOD PRESSURE: 120 MMHG | DIASTOLIC BLOOD PRESSURE: 76 MMHG | TEMPERATURE: 97.4 F | HEART RATE: 70 BPM | WEIGHT: 210.6 LBS | OXYGEN SATURATION: 97 % | BODY MASS INDEX: 30.22 KG/M2

## 2022-05-04 DIAGNOSIS — I10 ESSENTIAL (PRIMARY) HYPERTENSION: ICD-10-CM

## 2022-05-04 LAB
GLUCOSE BLDC GLUCOMTR-MCNC: 133
HBA1C MFR BLD HPLC: 6.4

## 2022-05-04 PROCEDURE — 99215 OFFICE O/P EST HI 40 MIN: CPT | Mod: 25

## 2022-05-04 PROCEDURE — 83036 HEMOGLOBIN GLYCOSYLATED A1C: CPT | Mod: QW

## 2022-05-04 PROCEDURE — 82962 GLUCOSE BLOOD TEST: CPT

## 2022-05-04 NOTE — ASSESSMENT
[FreeTextEntry1] : 66M  DM2, hypothyroidism, HTN, HLD vit D deficiency presents for follow up.\par \par 1) DM2 with microalbuminuria\par HbA1c 6.4% at goal\par Glucose log showing well controlled glucose with no hypoglycemia. \par Continue Levemir 48 units qhs\par Continue Novolog 10/8/8. \par Continue Jardiance 25mg daily\par f/u ophtho, podiatry, nephrology\par Recommend check predinner alternating with bedtime FS for additional data (currently checks AM, prelunch and occasional predinner).\par \par 2) Hypothyroidism\par Check TFTs\par continue levothyroxine 100 mcg daily - takes mid day with other meds, if TFTs normal can keep same, if TSH elevated can change to AM time.\par \par 3) Vit D deficiency - on ergocalciferol 50,000u 2x per month\par 25OHD 36 at goal\par \par 4) microalbuminuria \par Following with nephrology Dr. Amaya. \par Microalbumin/cr 46 in July 2021. Recommend continue Jardiance for renal protection.\par Off Olmesartan for prior low BP\par \par 5) HLD with elevated triglycerides - continue niacin and recommend lower carb diet. \par Clarify why not on statin at next visit\par also previously on zetia\par LDL 94\par Trig 333\par recheck lipid profile\par \par follow up 6 months\par

## 2022-05-04 NOTE — PHYSICAL EXAM
[Foot Ulcers] : no foot ulcers [Diminished Throughout Both Feet] : normal tactile sensation with monofilament testing throughout both feet [#1 Diminished] : number 1 was normal [#2 Diminished] : number 2 was normal [#3 Diminished] : number 3 was normal [#4 Diminished] : number 4 was normal [#5 Diminished] : number 5 was normal [#6 Diminished] : number 6 was normal

## 2022-05-04 NOTE — HISTORY OF PRESENT ILLNESS
[FreeTextEntry1] : 66M here for follow up of DM2. Also with MS and prior but not recent steroid use.\par He has been managed on basal bolus insulin + Jardiance.\par HbA1c today 6.4%.\par \par Current regimen:\par Levemir 48 units qhs\par Novolog 10/8/8 (previously was 10/10/8)\par Jardiance 25mg daily \par Some issues with cost of meds but spreads out refilling the scripts (not running out of meds).\par \par No lows. No sxs of hyperglycemia.\par \par Previous intolerance to metformin (diarrhea) and GLP-1 (severe abd pain, nausea).\par Weight stable\par \par Glucose log reviewed:\par -150\par prelunch \par predinner 103-111 with a 1x excursion to 193 after prune juice\par \par Prior issue with rectal fissures/pain required procedures now resolved.\par He followed up at [facility] and found out he has had recurrence of cancer lesions on his kidneys (history of partial bilateral nephrectomy retained 85% of left and retained 90% on the right). He was told it will just be monitored for now.\par Following with nephrology Dr. Amaya. Has f/u in Nov 2021.\par He is off Olmesartan since BP was lower side in the past. /80 today.\par up to date with ophtho, no retinopathy. Denies numbness/tingling in feet aside from chronic MS symptoms.\par + Microalbuminuria, on Jardiance.\par \par Acquired hypothyroidism on levothyroxine 100 mcg. Reports taking this mid day with other meds.Has been taking it this way long term.\par Vitamin D deficiency on ergocalciferol 50,000u 2 x per month\par \par Trig 333\par LDL 94\par Taking niacin 500mg daily\par Microalb/cr 46 in July 2021. On Jardiance.

## 2022-05-05 ENCOUNTER — RX RENEWAL (OUTPATIENT)
Age: 67
End: 2022-05-05

## 2022-05-05 LAB
25(OH)D3 SERPL-MCNC: 29.3 NG/ML
ALBUMIN SERPL ELPH-MCNC: 4.9 G/DL
ALP BLD-CCNC: 58 U/L
ALT SERPL-CCNC: 16 U/L
ANION GAP SERPL CALC-SCNC: 13 MMOL/L
AST SERPL-CCNC: 14 U/L
BASOPHILS # BLD AUTO: 0.11 K/UL
BASOPHILS NFR BLD AUTO: 1.2 %
BILIRUB SERPL-MCNC: 0.7 MG/DL
BUN SERPL-MCNC: 13 MG/DL
CALCIUM SERPL-MCNC: 9.5 MG/DL
CHLORIDE SERPL-SCNC: 105 MMOL/L
CHOLEST SERPL-MCNC: 183 MG/DL
CO2 SERPL-SCNC: 23 MMOL/L
CREAT SERPL-MCNC: 1.11 MG/DL
CREAT SPEC-SCNC: 126 MG/DL
EGFR: 74 ML/MIN/1.73M2
EOSINOPHIL # BLD AUTO: 0.33 K/UL
EOSINOPHIL NFR BLD AUTO: 3.7 %
GLUCOSE SERPL-MCNC: 127 MG/DL
HCT VFR BLD CALC: 42.2 %
HDLC SERPL-MCNC: 30 MG/DL
HGB BLD-MCNC: 14.3 G/DL
IMM GRANULOCYTES NFR BLD AUTO: 0.9 %
LDLC SERPL CALC-MCNC: 120 MG/DL
LYMPHOCYTES # BLD AUTO: 0.9 K/UL
LYMPHOCYTES NFR BLD AUTO: 10.2 %
MAN DIFF?: NORMAL
MCHC RBC-ENTMCNC: 26.5 PG
MCHC RBC-ENTMCNC: 33.9 GM/DL
MCV RBC AUTO: 78.1 FL
MICROALBUMIN 24H UR DL<=1MG/L-MCNC: 28.5 MG/DL
MICROALBUMIN/CREAT 24H UR-RTO: 227 MG/G
MONOCYTES # BLD AUTO: 0.74 K/UL
MONOCYTES NFR BLD AUTO: 8.4 %
NEUTROPHILS # BLD AUTO: 6.66 K/UL
NEUTROPHILS NFR BLD AUTO: 75.6 %
NONHDLC SERPL-MCNC: 152 MG/DL
PLATELET # BLD AUTO: 205 K/UL
POTASSIUM SERPL-SCNC: 4.3 MMOL/L
PROT SERPL-MCNC: 8 G/DL
RBC # BLD: 5.4 M/UL
RBC # FLD: 16.1 %
SODIUM SERPL-SCNC: 141 MMOL/L
T3 SERPL-MCNC: 101 NG/DL
T4 FREE SERPL-MCNC: 1.2 NG/DL
TRIGL SERPL-MCNC: 162 MG/DL
TSH SERPL-ACNC: 3.74 UIU/ML
VIT B12 SERPL-MCNC: 339 PG/ML
WBC # FLD AUTO: 8.82 K/UL

## 2022-05-09 ENCOUNTER — RX RENEWAL (OUTPATIENT)
Age: 67
End: 2022-05-09

## 2022-06-01 ENCOUNTER — RX RENEWAL (OUTPATIENT)
Age: 67
End: 2022-06-01

## 2022-06-06 RX ORDER — INSULIN ASPART 100 [IU]/ML
100 INJECTION, SOLUTION INTRAVENOUS; SUBCUTANEOUS
Qty: 45 | Refills: 1 | Status: DISCONTINUED | COMMUNITY
Start: 2019-11-06 | End: 2022-06-06

## 2022-09-07 ENCOUNTER — APPOINTMENT (OUTPATIENT)
Dept: ENDOCRINOLOGY | Facility: CLINIC | Age: 67
End: 2022-09-07

## 2022-10-12 ENCOUNTER — APPOINTMENT (OUTPATIENT)
Dept: ENDOCRINOLOGY | Facility: CLINIC | Age: 67
End: 2022-10-12

## 2022-10-12 VITALS
WEIGHT: 222 LBS | SYSTOLIC BLOOD PRESSURE: 124 MMHG | HEART RATE: 68 BPM | HEIGHT: 70 IN | TEMPERATURE: 97.9 F | DIASTOLIC BLOOD PRESSURE: 76 MMHG | BODY MASS INDEX: 31.78 KG/M2

## 2022-10-12 LAB — HBA1C MFR BLD HPLC: 7

## 2022-10-12 PROCEDURE — 99214 OFFICE O/P EST MOD 30 MIN: CPT | Mod: 25

## 2022-10-12 PROCEDURE — 83036 HEMOGLOBIN GLYCOSYLATED A1C: CPT | Mod: QW

## 2022-10-12 RX ORDER — LOSARTAN POTASSIUM 25 MG/1
25 TABLET, FILM COATED ORAL
Refills: 0 | Status: ACTIVE | COMMUNITY

## 2022-10-12 NOTE — ASSESSMENT
[FreeTextEntry1] : 66M  DM2, hypothyroidism, HTN, HLD vit D deficiency presents for follow up.\par \par 1) DM2 with microalbuminuria\par HbA1c 7.0% uptrend but near goal of < 7%\par Patient wishes to work on dietary improvements rather than adjusting meds.\par Glucose log showing AM glucose higher than a few months ago.\par Continue Levemir 48 units qhs\par Continue Novolog 10/8/8. \par Continue Jardiance 25mg daily\par f/u ophtho, podiatry, nephrology\par Recommend check predinner alternating with bedtime FS for additional data (currently checks AM, prelunch and occasional predinner).\par \par 2) Hypothyroidism\par Check TFTs\par continue levothyroxine 100 mcg daily - takes mid day with other meds, if TFTs normal can keep same, if TSH elevated can change to AM time.\par \par 3) Vit D deficiency - on ergocalciferol 50,000u 2x per month\par 25OHD 36 at goal\par \par 4) microalbuminuria \par Following with nephrology Dr. Amaya. \par Microalbumin/cr 46 in July 2021. Recommend continue Jardiance for renal protection.\par Resumed on Losartan 25mg Aug 2022.\par \par 5) HLD with elevated triglycerides - continue niacin and recommend lower carb diet. \par Continue atorvastatin 10mg started last visit \par recheck lipid profile with next labs.\par \par follow up 3 months\par

## 2022-10-12 NOTE — PHYSICAL EXAM
[Alert] : alert [Well Nourished] : well nourished [No Acute Distress] : no acute distress [EOMI] : extra ocular movement intact [No Proptosis] : no proptosis [No Lid Lag] : no lid lag [Supple] : the neck was supple [Thyroid Not Enlarged] : the thyroid was not enlarged [No Thyroid Nodules] : no palpable thyroid nodules [No Respiratory Distress] : no respiratory distress [No Accessory Muscle Use] : no accessory muscle use [Normal Rate and Effort] : normal respiratory rate and effort [Normal Rate] : heart rate was normal [Regular Rhythm] : with a regular rhythm [No Edema] : no peripheral edema [Pedal Pulses Normal] : the pedal pulses are present [Not Tender] : non-tender [Soft] : abdomen soft [No Involuntary Movements] : no involuntary movements were seen [No Joint Swelling] : no joint swelling seen [Right foot was examined, including] : right foot ~C was examined, including visual inspection with sensory and pulse exams [Left foot was examined, including] : left foot ~C was examined, including visual inspection with sensory and pulse exams [Normal] : normal [Full ROM] : with full range of motion [2+] : 2+ in the dorsalis pedis [No Motor Deficits] : the motor exam was normal [Oriented x3] : oriented to person, place, and time [Normal Affect] : the affect was normal [Normal Mood] : the mood was normal [Foot Ulcers] : no foot ulcers [Diminished Throughout Both Feet] : normal tactile sensation with monofilament testing throughout both feet [#1 Diminished] : number 1 was normal [#2 Diminished] : number 2 was normal [#3 Diminished] : number 3 was normal [#4 Diminished] : number 4 was normal [#5 Diminished] : number 5 was normal [#6 Diminished] : number 6 was normal

## 2022-10-12 NOTE — HISTORY OF PRESENT ILLNESS
[FreeTextEntry1] : 67M here for follow up of DM2. Also with MS and prior but not recent steroid use.\par He has been managed on basal bolus insulin + Jardiance.\par HbA1c today 7.0% increased from prior 6.4%.\par \par Current regimen:\par Levemir 48 units qhs\par Novolog 10/8/8 (previously was 10/10/8)\par Jardiance 25mg daily \par Some issues with cost of meds but spreads out refilling the scripts (not running out of meds).\par \par No lows. No sxs of hyperglycemia.\par \par Previous intolerance to metformin (diarrhea) and GLP-1 (severe abd pain, nausea).\par Weight stable\par \par Glucose log reviewed:\par -160 occasionally higher\par PM \par \par Prior issue with rectal fissures/pain required procedures now resolved.\par He followed up at [facility] and found out he has had recurrence of cancer lesions on his kidneys (history of partial bilateral nephrectomy retained 85% of left and retained 90% on the right). He was told it will just be monitored for now.\par Following with nephrology Dr. Amaya. Has f/u in Nov 2021.\par He is off Olmesartan since BP was lower side in the past. Was recently restarted on Losartan 25mg.\par up to date with ophtho, no retinopathy. Denies numbness/tingling in feet aside from chronic MS symptoms.\par + Microalbuminuria, on Jardiance.\par \par Acquired hypothyroidism on levothyroxine 100 mcg. Reports taking this mid day with other meds.Has been taking it this way long term.\par Vitamin D deficiency on ergocalciferol 50,000u 2 x per month\par \par Taking niacin 500mg daily\par Last visit added atorvastatin 10mg\par

## 2022-10-12 NOTE — REVIEW OF SYSTEMS
[As Noted in HPI] : as noted in HPI [Negative] : Heme/Lymph [All other systems negative] : All other systems negative [de-identified] : tingling lower extremities related to MS

## 2022-12-01 ENCOUNTER — RX RENEWAL (OUTPATIENT)
Age: 67
End: 2022-12-01

## 2023-01-03 ENCOUNTER — NON-APPOINTMENT (OUTPATIENT)
Age: 68
End: 2023-01-03

## 2023-02-01 ENCOUNTER — APPOINTMENT (OUTPATIENT)
Dept: ENDOCRINOLOGY | Facility: CLINIC | Age: 68
End: 2023-02-01
Payer: MEDICARE

## 2023-02-01 VITALS
WEIGHT: 213 LBS | BODY MASS INDEX: 30.49 KG/M2 | SYSTOLIC BLOOD PRESSURE: 110 MMHG | DIASTOLIC BLOOD PRESSURE: 70 MMHG | HEIGHT: 70 IN | HEART RATE: 63 BPM | OXYGEN SATURATION: 98 %

## 2023-02-01 LAB — GLUCOSE BLDC GLUCOMTR-MCNC: 136

## 2023-02-01 PROCEDURE — 83036 HEMOGLOBIN GLYCOSYLATED A1C: CPT | Mod: QW

## 2023-02-01 PROCEDURE — 99215 OFFICE O/P EST HI 40 MIN: CPT | Mod: 25

## 2023-02-01 PROCEDURE — 82962 GLUCOSE BLOOD TEST: CPT

## 2023-02-01 NOTE — ASSESSMENT
[FreeTextEntry1] : 67M  DM2, hypothyroidism, HTN, HLD vit D deficiency presents for follow up.\par \par 1) DM2 with microalbuminuria\par - 10/2022 HbA1c 7.0%, today 2/1/2023 7.8%\par - Glucose log showing AM glucose and pre dinner glucose higher \par - Increase Levemir to 52 units qhs, if after 1-2 weeks still having AM glucose readings > 130 then patient was instructed to increase Levemir to 54 units QHS\par - Continue Humalog 10 units before breakfast and 8 units before lunc, increase dinner Humalog to 10 units \par - Has 3 month supply of humalog at home but would like to switch back to Novolog when time for a new prescription as insurance now covering Novolog again. \par - Continue Jardiance 25mg daily\par - f/u ophtho, podiatry, nephrology\par - Recommend check predinner alternating with bedtime FS for additional data (currently checks AM and predinner).\par - Will attempt to get CGM for patient\par - Repeat labs today\par \par - Patient requires a therapeutic CGM\par - Patient has diabetes mellitus\par - Patient has been using a home blood glucose monitor and performing frequent (four times a day) testing, 6 months\par - Patient is being insulin-treated with multiple daily injections (MDI) of insulin x4day, x 6months or a continuous subcutaneous insulin infusion (CSII) pump [id number]\par - Patient insulin treatment regimen requires frequent adjustments by the Patient on the basis of therapeutic CGM testing results.\par  \par \par 2) Hypothyroidism\par - Check TFTs\par - continue levothyroxine 100 mcg daily - takes mid day with other meds, if TFTs normal can keep same, if TSH elevated can change to AM time.\par \par 3) Vit D deficiency \par - on ergocalciferol 50,000u 2x per month\par - 25OHD 36 at goal\par - Will repeat level\par \par 4) microalbuminuria \par - Following with nephrology Dr. Amaya. \par - Microalbumin/cr 46 in July 2021. Recommend continue Jardiance for renal protection.\par - Resumed on Losartan 25mg Aug 2022.\par - Previously on Kerendia which was stopped. Follows nephrology. \par \par 5) HLD with elevated triglycerides \par - continue niacin and recommend lower carb diet. \par - Continue atorvastatin 10mg \par - Will recheck lipid profile\par \par Follow up 3 months\par \par Patient seen with Dr. Sanchez\par \par Riana Rosen PA-C\par [facility]

## 2023-02-01 NOTE — PHYSICAL EXAM
[Alert] : alert [No Acute Distress] : no acute distress [No Proptosis] : no proptosis [Supple] : the neck was supple [Thyroid Not Enlarged] : the thyroid was not enlarged [No Thyroid Nodules] : no palpable thyroid nodules [No Respiratory Distress] : no respiratory distress [No Accessory Muscle Use] : no accessory muscle use [Normal Rate] : heart rate was normal [Regular Rhythm] : with a regular rhythm [Not Tender] : non-tender [Soft] : abdomen soft [Right foot was examined, including] : right foot ~C was examined, including visual inspection with sensory and pulse exams [Left foot was examined, including] : left foot ~C was examined, including visual inspection with sensory and pulse exams [Oriented x3] : oriented to person, place, and time [Normal Affect] : the affect was normal [Normal Mood] : the mood was normal [Well Developed] : well developed [Normal Sclera/Conjunctiva] : normal sclera/conjunctiva [No Neck Mass] : no neck mass was observed [Clear to Auscultation] : lungs were clear to auscultation bilaterally [Normal S1, S2] : normal S1 and S2 [Not Distended] : not distended [No Tremors] : no tremors [Foot Ulcers] : no foot ulcers [Diminished Throughout Both Feet] : normal tactile sensation with monofilament testing throughout both feet [#1 Diminished] : number 1 was normal [#2 Diminished] : number 2 was normal [#3 Diminished] : number 3 was normal [#4 Diminished] : number 4 was normal [#5 Diminished] : number 5 was normal [#6 Diminished] : number 6 was normal [de-identified] : Mild b/l LE edema

## 2023-02-01 NOTE — HISTORY OF PRESENT ILLNESS
[FreeTextEntry1] : 67M here for follow up of DM2. Also with MS with prior but not recent steroid use.\par \par DM2\par - He has been managed on basal bolus insulin + Jardiance.\par - 10/2022 HbA1c 7.0%, today 2023 7.8%\par \par Current regimen:\par - Levemir 50 units qhs\par - Humalog 10/8/8 (previously was 10/10/8) --- would like to switch over back to novolog as insurance is now covering it \par - Jardiance 25mg daily -- denies recent UTI's or yeast infections\par \par Some issues with cost of meds but spreads out refilling the scripts (not running out of meds)\par Previous intolerance to metformin (diarrhea) and GLP-1 (severe abd pain, nausea).\par \par Denies hypoglycemic events\par Denies polyuria, polydipsia, or blurry vision\par Weight stable\par \par Diet: states he stays up late at night 2/2 pain from colon/rectal issues, snacks at night\par Breakfast: cereal or eggs with toast and silva \par Lunch: often skips, if eating: tuna fish or BLT, or fruit\par Dinner: fish or beef with vegetables with half baked potato\par Snacks: usually after dinner, around 11pm-12am: crackers with cheese and herbal tea, occasional devil dog dessert cakes\par \par Glucose log reviewed:\par , 138, 171, 167, 124, 177, 155, 143, 170, 180, 168, 136\par Evnin, 123, 159, 128, 142, 96, 145, 149, 114\par \par Up to date with ophtho, no retinopathy, is making a follow up appointment soon \par Has numbness/tingling from waist down and feet, reports chronic MS symptoms\par Podiatry: does not see podiatry, denies active issues with feet\par \par + Microalbuminuria, on Jardiance. Previously on Kerendia which was stopped. Follows nephrology. \par \par Acquired hypothyroidism on levothyroxine 100 mcg. Reports taking this mid day with other meds. Has been taking it this way long term. Denies: increased fatigue, constipation or diarrhea, cold or heat intolerance, palpations, weight changes, or tremors. \par \par Vitamin D deficiency on ergocalciferol 50,000u 2 x per month\par \par He is off Olmesartan since BP was lower side in the past. Was restarted on Losartan 25mg.\par Taking niacin 500mg daily\par Recently started on furosemide 20 mg daily for LE swelling\par HLD: taking atorvastatin 10mg\par \par Prior issue with rectal fissures/pain required procedures now resolved. However, continues to have rectal spasms that keeps him up at night. \par He followed up at [facility] and found out he has had recurrence of cancer lesions on his kidneys (history of partial bilateral nephrectomy retained 85% of left and retained 90% on the right). He was told it will just be monitored for now. Has an appointment in 1 month. \par Following with nephrology Dr. Amaya.\par

## 2023-02-01 NOTE — END OF VISIT
[Time Spent: ___ minutes] : I have spent [unfilled] minutes of time on the encounter. [FreeTextEntry2] : DM2 following up - prescribe Theodora 2 will be new for patient.\par Adjust insulin doses as noted. Continue SGLT2i.\par Did not previously tolerate GLP1RA.\par Ongoing colorectal workup with symptoms that impact sleep and routine.

## 2023-02-01 NOTE — REVIEW OF SYSTEMS
[As Noted in HPI] : as noted in HPI [Negative] : Psychiatric [Fatigue] : no fatigue [Recent Weight Gain (___ Lbs)] : no recent weight gain [Recent Weight Loss (___ Lbs)] : no recent weight loss [Blurred Vision] : no blurred vision [Dysphagia] : no dysphagia [Neck Pain] : no neck pain [Dysphonia] : no dysphonia [Chest Pain] : no chest pain [Palpitations] : no palpitations [Shortness Of Breath] : no shortness of breath [Nausea] : no nausea [Abdominal Pain] : no abdominal pain [Vomiting] : no vomiting [Polyuria] : no polyuria [Tremors] : no tremors [Polydipsia] : no polydipsia [Cold Intolerance] : no cold intolerance [Heat Intolerance] : no heat intolerance [de-identified] : tingling lower extremities related to MS

## 2023-02-03 LAB
25(OH)D3 SERPL-MCNC: 47.2 NG/ML
ALBUMIN SERPL ELPH-MCNC: 4.9 G/DL
ALP BLD-CCNC: 65 U/L
ALT SERPL-CCNC: 22 U/L
ANION GAP SERPL CALC-SCNC: 15 MMOL/L
AST SERPL-CCNC: 12 U/L
BILIRUB SERPL-MCNC: 0.7 MG/DL
BUN SERPL-MCNC: 18 MG/DL
CALCIUM SERPL-MCNC: 9.8 MG/DL
CHLORIDE SERPL-SCNC: 104 MMOL/L
CHOLEST SERPL-MCNC: 137 MG/DL
CO2 SERPL-SCNC: 24 MMOL/L
CREAT SERPL-MCNC: 1.27 MG/DL
EGFR: 62 ML/MIN/1.73M2
GLUCOSE SERPL-MCNC: 129 MG/DL
HDLC SERPL-MCNC: 28 MG/DL
LDLC SERPL CALC-MCNC: 69 MG/DL
NONHDLC SERPL-MCNC: 110 MG/DL
POTASSIUM SERPL-SCNC: 4.9 MMOL/L
PROT SERPL-MCNC: 8 G/DL
SODIUM SERPL-SCNC: 144 MMOL/L
T4 FREE SERPL-MCNC: 0.9 NG/DL
TRIGL SERPL-MCNC: 205 MG/DL
TSH SERPL-ACNC: 6.85 UIU/ML

## 2023-03-03 ENCOUNTER — NON-APPOINTMENT (OUTPATIENT)
Age: 68
End: 2023-03-03

## 2023-04-17 ENCOUNTER — RX RENEWAL (OUTPATIENT)
Age: 68
End: 2023-04-17

## 2023-04-17 RX ORDER — PEN NEEDLE, DIABETIC 32GX 5/32"
32G X 4 MM NEEDLE, DISPOSABLE MISCELLANEOUS
Qty: 400 | Refills: 3 | Status: ACTIVE | COMMUNITY
Start: 2021-01-12 | End: 1900-01-01

## 2023-05-05 ENCOUNTER — NON-APPOINTMENT (OUTPATIENT)
Age: 68
End: 2023-05-05

## 2023-05-05 RX ORDER — INSULIN DETEMIR 100 [IU]/ML
100 INJECTION, SOLUTION SUBCUTANEOUS
Qty: 4 | Refills: 0 | Status: DISCONTINUED | COMMUNITY
Start: 2019-10-07 | End: 2023-05-05

## 2023-05-31 ENCOUNTER — APPOINTMENT (OUTPATIENT)
Dept: ENDOCRINOLOGY | Facility: CLINIC | Age: 68
End: 2023-05-31
Payer: MEDICARE

## 2023-05-31 VITALS
SYSTOLIC BLOOD PRESSURE: 110 MMHG | DIASTOLIC BLOOD PRESSURE: 66 MMHG | WEIGHT: 212 LBS | BODY MASS INDEX: 32.13 KG/M2 | OXYGEN SATURATION: 97 % | HEIGHT: 68 IN | HEART RATE: 77 BPM

## 2023-05-31 PROCEDURE — 95251 CONT GLUC MNTR ANALYSIS I&R: CPT

## 2023-05-31 PROCEDURE — 99215 OFFICE O/P EST HI 40 MIN: CPT | Mod: 25

## 2023-05-31 NOTE — HISTORY OF PRESENT ILLNESS
[FreeTextEntry1] : 67M here for follow up of DM2. Also with MS with prior but not recent steroid use.\par Many issues with colorectal pain, following up with specialist\par \par DM2\par - He has been managed on basal bolus insulin + Jardiance.\par -HbA1c 2/1/2023 7.8%\par Tried Theodora 2 was alarming for hypoglycemia inaccurate, stopped using it after 2 days.\par \par Current regimen:\par - Lantus 54 units qhs\par - Humalog 10/8/8 \par - Jardiance 25mg daily -- denies recent UTI's or yeast infections\par \par Tried Theodora wore it for 2 days felit it was inaccurate and removed.\par CGM download reviewed\par time active 14%\par average glu 129\par very high 0%\par high 3%\par TIR 97%\par low 0%\par very low 0%\par \par glucometer log reviewed\par -161\par prelunch \par predinner 130s\par bed 131\par \par Some issues with cost of meds but spreads out refilling the scripts (not running out of meds)\par Previous intolerance to metformin (diarrhea) and GLP-1 (severe abd pain, nausea).\par \par Denies hypoglycemic events\par Denies polyuria, polydipsia, or blurry vision\par Weight stable\par \par Diet: states he stays up late at night 2/2 pain from colon/rectal issues, snacks at night\par Breakfast: cereal or eggs with toast and silva \par Lunch: often skips, if eating: tuna fish or BLT, or fruit\par Dinner: fish or beef with vegetables with half baked potato\par Snacks: usually after dinner, around 11pm-12am: crackers with cheese and herbal tea, occasional devil dog dessert cakes\par \par Up to date with ophtho, no retinopathy, is making a follow up appointment soon \par Has numbness/tingling from waist down and feet, reports chronic MS symptoms\par Podiatry: does not see podiatry, denies active issues with feet\par \par + Microalbuminuria, on Jardiance. Previously on Kerendia which was stopped. Follows nephrology. \par \par Acquired hypothyroidism on levothyroxine 100 mcg. Reports taking this mid day with other meds. Has been taking it this way long term. Denies: increased fatigue, constipation or diarrhea, cold or heat intolerance, palpations, weight changes, or tremors. \par \par Vitamin D deficiency on ergocalciferol 50,000u 2 x per month\par \par He is off Olmesartan since BP was lower side in the past. Was restarted on Losartan 25mg.\par Taking niacin 500mg daily\par Recently started on furosemide 20 mg daily for LE swelling\par HLD: taking atorvastatin 10mg\par \par Prior issue with rectal fissures/pain required procedures now resolved. However, continues to have rectal spasms that keeps him up at night. \par He followed up at [facility] and found out he has had recurrence of cancer lesions on his kidneys (history of partial bilateral nephrectomy retained 85% of left and retained 90% on the right). He was told it will just be monitored for now. Has an appointment in 1 month. \par Following with nephrology Dr. Amaya.\par

## 2023-05-31 NOTE — PHYSICAL EXAM
[Alert] : alert [No Acute Distress] : no acute distress [Well Developed] : well developed [Normal Sclera/Conjunctiva] : normal sclera/conjunctiva [No Neck Mass] : no neck mass was observed [No Proptosis] : no proptosis [Supple] : the neck was supple [Thyroid Not Enlarged] : the thyroid was not enlarged [No Thyroid Nodules] : no palpable thyroid nodules [No Respiratory Distress] : no respiratory distress [No Accessory Muscle Use] : no accessory muscle use [Clear to Auscultation] : lungs were clear to auscultation bilaterally [Normal S1, S2] : normal S1 and S2 [Normal Rate] : heart rate was normal [Regular Rhythm] : with a regular rhythm [Not Tender] : non-tender [Not Distended] : not distended [Soft] : abdomen soft [Right foot was examined, including] : right foot ~C was examined, including visual inspection with sensory and pulse exams [Left foot was examined, including] : left foot ~C was examined, including visual inspection with sensory and pulse exams [No Tremors] : no tremors [Oriented x3] : oriented to person, place, and time [Normal Affect] : the affect was normal [Normal Mood] : the mood was normal [Foot Ulcers] : no foot ulcers [Diminished Throughout Both Feet] : normal tactile sensation with monofilament testing throughout both feet [#1 Diminished] : number 1 was normal [#2 Diminished] : number 2 was normal [#3 Diminished] : number 3 was normal [#4 Diminished] : number 4 was normal [#5 Diminished] : number 5 was normal [#6 Diminished] : number 6 was normal [de-identified] : Mild b/l LE edema

## 2023-05-31 NOTE — REVIEW OF SYSTEMS
[As Noted in HPI] : as noted in HPI [Negative] : Psychiatric [Fatigue] : no fatigue [Recent Weight Gain (___ Lbs)] : no recent weight gain [Recent Weight Loss (___ Lbs)] : no recent weight loss [Blurred Vision] : no blurred vision [Dysphagia] : no dysphagia [Neck Pain] : no neck pain [Dysphonia] : no dysphonia [Chest Pain] : no chest pain [Palpitations] : no palpitations [Shortness Of Breath] : no shortness of breath [Nausea] : no nausea [Abdominal Pain] : no abdominal pain [Vomiting] : no vomiting [Polyuria] : no polyuria [Tremors] : no tremors [Polydipsia] : no polydipsia [Cold Intolerance] : no cold intolerance [Heat Intolerance] : no heat intolerance [de-identified] : tingling lower extremities related to MS

## 2023-05-31 NOTE — ASSESSMENT
[FreeTextEntry1] : 67M  DM2, hypothyroidism, HTN, HLD vit D deficiency presents for follow up.\par \par 1) DM2 with microalbuminuria\par - 2/1/23 7.8%, will send HbA1c with labs\par Patient having some difficulty pressing the Lantus pen\par had to switch from Levemir for insurance issue but was told [facility] that levemir and novolog would be covered.\par Will try Tresiba 54 units qhs (while still using Lantus wife can help inject or take in 2 injections for total of 54 to see if pressing button is easier).\par - Continue Humalog 10 units before breakfast and 8 units before lunc, dinner Humalog 8 units - will try changing back to Novolog per patient preference\par - Continue Jardiance 25mg daily\par - f/u ophtho, podiatry, nephrology\par - Retry Theodora (used once for 2 days and reports inaccurate)\par - Repeat labs today\par \par - Patient requires a therapeutic CGM\par - Patient has diabetes mellitus\par - Patient has been using a home blood glucose monitor and performing frequent (four times a day) testing, 6 months\par - Patient is being insulin-treated with multiple daily injections (MDI) of insulin x4day, x 6months or a continuous subcutaneous insulin infusion (CSII) pump [id number]\par - Patient insulin treatment regimen requires frequent adjustments by the Patient on the basis of therapeutic CGM testing results.\par  \par \par 2) Hypothyroidism\par - Check TFTs\par - continue levothyroxine 112 mcg daily \par \par 3) Vit D deficiency \par - on ergocalciferol 50,000u 2x per month\par - 25OHD 36 at goal\par - Will repeat level\par \par 4) microalbuminuria \par - Following with nephrology Dr. Amaya. \par - Microalbumin/cr 46 in July 2021. Recommend continue Jardiance for renal protection.\par - Resumed on Losartan 25mg Aug 2022.\par - Previously on Kerendia which was stopped. Follows nephrology. \par \par 5) HLD with elevated triglycerides \par - continue niacin and recommend lower carb diet. \par - Continue atorvastatin 10mg \par - Will recheck lipid profile\par \par Follow up 3 months\par \par

## 2023-06-01 ENCOUNTER — NON-APPOINTMENT (OUTPATIENT)
Age: 68
End: 2023-06-01

## 2023-06-01 LAB
ALBUMIN SERPL ELPH-MCNC: 4.8 G/DL
ALP BLD-CCNC: 62 U/L
ALT SERPL-CCNC: 13 U/L
ANION GAP SERPL CALC-SCNC: 14 MMOL/L
AST SERPL-CCNC: 11 U/L
BILIRUB SERPL-MCNC: 0.6 MG/DL
BUN SERPL-MCNC: 14 MG/DL
CALCIUM SERPL-MCNC: 9.6 MG/DL
CHLORIDE SERPL-SCNC: 105 MMOL/L
CO2 SERPL-SCNC: 23 MMOL/L
CREAT SERPL-MCNC: 1.29 MG/DL
EGFR: 61 ML/MIN/1.73M2
ESTIMATED AVERAGE GLUCOSE: 151 MG/DL
GLUCOSE SERPL-MCNC: 127 MG/DL
HBA1C MFR BLD HPLC: 6.9 %
POTASSIUM SERPL-SCNC: 4.8 MMOL/L
PROT SERPL-MCNC: 7.8 G/DL
SODIUM SERPL-SCNC: 143 MMOL/L
T4 FREE SERPL-MCNC: 1.2 NG/DL
TSH SERPL-ACNC: 1.57 UIU/ML

## 2023-06-02 ENCOUNTER — NON-APPOINTMENT (OUTPATIENT)
Age: 68
End: 2023-06-02

## 2023-09-20 ENCOUNTER — APPOINTMENT (OUTPATIENT)
Dept: ENDOCRINOLOGY | Facility: CLINIC | Age: 68
End: 2023-09-20
Payer: MEDICARE

## 2023-09-20 VITALS
HEIGHT: 68 IN | BODY MASS INDEX: 33.49 KG/M2 | WEIGHT: 221 LBS | DIASTOLIC BLOOD PRESSURE: 70 MMHG | SYSTOLIC BLOOD PRESSURE: 126 MMHG | OXYGEN SATURATION: 98 % | HEART RATE: 64 BPM

## 2023-09-20 LAB — HBA1C MFR BLD HPLC: 7

## 2023-09-20 PROCEDURE — 99214 OFFICE O/P EST MOD 30 MIN: CPT | Mod: 25

## 2023-09-20 PROCEDURE — 83036 HEMOGLOBIN GLYCOSYLATED A1C: CPT | Mod: QW

## 2023-09-24 ENCOUNTER — EMERGENCY (EMERGENCY)
Facility: HOSPITAL | Age: 68
LOS: 1 days | Discharge: HOME | End: 2023-09-24
Attending: EMERGENCY MEDICINE | Admitting: EMERGENCY MEDICINE
Payer: MEDICARE

## 2023-09-24 VITALS
OXYGEN SATURATION: 99 % | SYSTOLIC BLOOD PRESSURE: 137 MMHG | RESPIRATION RATE: 20 BRPM | DIASTOLIC BLOOD PRESSURE: 69 MMHG | HEART RATE: 67 BPM

## 2023-09-24 VITALS
TEMPERATURE: 100 F | DIASTOLIC BLOOD PRESSURE: 71 MMHG | SYSTOLIC BLOOD PRESSURE: 130 MMHG | RESPIRATION RATE: 18 BRPM | HEART RATE: 76 BPM | OXYGEN SATURATION: 100 %

## 2023-09-24 DIAGNOSIS — Z85.528 PERSONAL HISTORY OF OTHER MALIGNANT NEOPLASM OF KIDNEY: Chronic | ICD-10-CM

## 2023-09-24 LAB
ALBUMIN SERPL ELPH-MCNC: 4.4 G/DL — SIGNIFICANT CHANGE UP (ref 3.3–5)
ALP SERPL-CCNC: 51 U/L — SIGNIFICANT CHANGE UP (ref 40–120)
ALT FLD-CCNC: 18 U/L — SIGNIFICANT CHANGE UP (ref 4–41)
ANION GAP SERPL CALC-SCNC: 12 MMOL/L — SIGNIFICANT CHANGE UP (ref 7–14)
AST SERPL-CCNC: 21 U/L — SIGNIFICANT CHANGE UP (ref 4–40)
BASOPHILS # BLD AUTO: 0.04 K/UL — SIGNIFICANT CHANGE UP (ref 0–0.2)
BASOPHILS NFR BLD AUTO: 0.6 % — SIGNIFICANT CHANGE UP (ref 0–2)
BILIRUB SERPL-MCNC: 0.6 MG/DL — SIGNIFICANT CHANGE UP (ref 0.2–1.2)
BUN SERPL-MCNC: 15 MG/DL — SIGNIFICANT CHANGE UP (ref 7–23)
CALCIUM SERPL-MCNC: 8.9 MG/DL — SIGNIFICANT CHANGE UP (ref 8.4–10.5)
CHLORIDE SERPL-SCNC: 104 MMOL/L — SIGNIFICANT CHANGE UP (ref 98–107)
CO2 SERPL-SCNC: 21 MMOL/L — LOW (ref 22–31)
CREAT SERPL-MCNC: 1.44 MG/DL — HIGH (ref 0.5–1.3)
EGFR: 53 ML/MIN/1.73M2 — LOW
EOSINOPHIL # BLD AUTO: 0.15 K/UL — SIGNIFICANT CHANGE UP (ref 0–0.5)
EOSINOPHIL NFR BLD AUTO: 2.4 % — SIGNIFICANT CHANGE UP (ref 0–6)
GLUCOSE SERPL-MCNC: 108 MG/DL — HIGH (ref 70–99)
HCT VFR BLD CALC: 34.3 % — LOW (ref 39–50)
HGB BLD-MCNC: 12 G/DL — LOW (ref 13–17)
IANC: 4.5 K/UL — SIGNIFICANT CHANGE UP (ref 1.8–7.4)
IMM GRANULOCYTES NFR BLD AUTO: 0.5 % — SIGNIFICANT CHANGE UP (ref 0–0.9)
LYMPHOCYTES # BLD AUTO: 0.74 K/UL — LOW (ref 1–3.3)
LYMPHOCYTES # BLD AUTO: 11.6 % — LOW (ref 13–44)
MCHC RBC-ENTMCNC: 26.8 PG — LOW (ref 27–34)
MCHC RBC-ENTMCNC: 35 GM/DL — SIGNIFICANT CHANGE UP (ref 32–36)
MCV RBC AUTO: 76.7 FL — LOW (ref 80–100)
MONOCYTES # BLD AUTO: 0.91 K/UL — HIGH (ref 0–0.9)
MONOCYTES NFR BLD AUTO: 14.3 % — HIGH (ref 2–14)
NEUTROPHILS # BLD AUTO: 4.5 K/UL — SIGNIFICANT CHANGE UP (ref 1.8–7.4)
NEUTROPHILS NFR BLD AUTO: 70.6 % — SIGNIFICANT CHANGE UP (ref 43–77)
NRBC # BLD: 0 /100 WBCS — SIGNIFICANT CHANGE UP (ref 0–0)
NRBC # FLD: 0 K/UL — SIGNIFICANT CHANGE UP (ref 0–0)
PLATELET # BLD AUTO: 158 K/UL — SIGNIFICANT CHANGE UP (ref 150–400)
POTASSIUM SERPL-MCNC: 4.5 MMOL/L — SIGNIFICANT CHANGE UP (ref 3.5–5.3)
POTASSIUM SERPL-SCNC: 4.5 MMOL/L — SIGNIFICANT CHANGE UP (ref 3.5–5.3)
PROT SERPL-MCNC: 8.1 G/DL — SIGNIFICANT CHANGE UP (ref 6–8.3)
RBC # BLD: 4.47 M/UL — SIGNIFICANT CHANGE UP (ref 4.2–5.8)
RBC # FLD: 16 % — HIGH (ref 10.3–14.5)
SODIUM SERPL-SCNC: 137 MMOL/L — SIGNIFICANT CHANGE UP (ref 135–145)
WBC # BLD: 6.37 K/UL — SIGNIFICANT CHANGE UP (ref 3.8–10.5)
WBC # FLD AUTO: 6.37 K/UL — SIGNIFICANT CHANGE UP (ref 3.8–10.5)

## 2023-09-24 PROCEDURE — 93010 ELECTROCARDIOGRAM REPORT: CPT

## 2023-09-24 PROCEDURE — 99284 EMERGENCY DEPT VISIT MOD MDM: CPT | Mod: GC

## 2023-09-24 PROCEDURE — 71046 X-RAY EXAM CHEST 2 VIEWS: CPT | Mod: 26

## 2023-09-24 RX ORDER — AZITHROMYCIN 500 MG/1
1 TABLET, FILM COATED ORAL
Qty: 8 | Refills: 0
Start: 2023-09-24 | End: 2023-09-27

## 2023-09-24 RX ORDER — AZITHROMYCIN 500 MG/1
1 TABLET, FILM COATED ORAL
Qty: 4 | Refills: 0
Start: 2023-09-24 | End: 2023-09-27

## 2023-09-24 RX ORDER — CEFTRIAXONE 500 MG/1
1000 INJECTION, POWDER, FOR SOLUTION INTRAMUSCULAR; INTRAVENOUS ONCE
Refills: 0 | Status: COMPLETED | OUTPATIENT
Start: 2023-09-24 | End: 2023-09-24

## 2023-09-24 RX ORDER — AZITHROMYCIN 500 MG/1
500 TABLET, FILM COATED ORAL ONCE
Refills: 0 | Status: COMPLETED | OUTPATIENT
Start: 2023-09-24 | End: 2023-09-24

## 2023-09-24 RX ORDER — ACETAMINOPHEN 500 MG
975 TABLET ORAL ONCE
Refills: 0 | Status: COMPLETED | OUTPATIENT
Start: 2023-09-24 | End: 2023-09-24

## 2023-09-24 RX ADMIN — CEFTRIAXONE 100 MILLIGRAM(S): 500 INJECTION, POWDER, FOR SOLUTION INTRAMUSCULAR; INTRAVENOUS at 15:02

## 2023-09-24 RX ADMIN — AZITHROMYCIN 255 MILLIGRAM(S): 500 TABLET, FILM COATED ORAL at 16:55

## 2023-09-24 RX ADMIN — Medication 975 MILLIGRAM(S): at 11:24

## 2023-09-24 NOTE — ED PROVIDER NOTE - OBJECTIVE STATEMENT
67 y/o M with PMHx of DM, MS and renal CA s/p bilateral partial nephrectomy (in remission, no active treatment) presents to ED c/o congestion, dry cough and chest congestion x 2 days. Also endorsing an episode of loose stool, mild headache and post nasal drip. Has sick contacts at home, multiple family members with similar symptoms, had + home COVID test 2 days ago at symptom onset. Is concerned he will develop pneumonia. Denies fevers, chest pain, difficulty breathing, abdominal pain, nausea, vomiting, decreased PO intake or urinary symptoms. Has been taking aspirin infrequently. NKDA.

## 2023-09-24 NOTE — ED PROVIDER NOTE - PHYSICAL EXAMINATION
Gen: well appearing, in no acute distress   Head: normal appearing  HEENT: normal conjunctiva, oral mucosa moist, vision grossly intact   Lung: no respiratory distress, speaking in full sentences, poor inspiratory effort, CTA b/l, no wheeze, crackles or rhonchi   CV: regular rate and rhythm, no murmurs, no LE edema   Abd: soft, non distended, non tender   MSK: no visible deformities, ambulating without difficulty   Neuro: No focal deficits, AAOx3  Skin: Warm, no rashes   Psych: normal affect

## 2023-09-24 NOTE — ED ADULT NURSE NOTE - OBJECTIVE STATEMENT
67 y/O M AAOX4, ambulatory at baseline presenting to intake room 1. Pt c/o generalized body aches, fevers, chills x2 days. Pt states he thinks his wife was exposed to covid earlier this week. Hx MS, DM, HTN partial nephrectomy of bilateral kidneys. Pt denies chest pain, headache, dizziness. Breathing even and unlabored. No pallor or diaphoresis noted at this time. Medications administered as per provider order. Tolerated well.

## 2023-09-24 NOTE — ED PROVIDER NOTE - NSFOLLOWUPINSTRUCTIONS_ED_ALL_ED_FT
Viral Respiratory Infection    A viral respiratory infection is an illness that affects parts of the body used for breathing, like the lungs, nose, and throat. It is caused by a germ called a virus. Symptoms can include runny nose, coughing, sneezing, fatigue, body aches, sore throat, fever, or headache.     A copy of your chest x-ray results are below.    You may take Tylenol up to 650 mg every 6 hours as needed for pain and/or Motrin up to 600 mg every 8 hours as needed for pain.    We recommend follow-up with your primary care doctor in the next 5-7 days.     SEEK IMMEDIATE MEDICAL CARE IF YOU HAVE ANY OF THE FOLLOWING SYMPTOMS: shortness of breath, chest pain, fever over 10 days, or lightheadedness/dizziness. Viral Respiratory Infection    A viral respiratory infection is an illness that affects parts of the body used for breathing, like the lungs, nose, and throat. It is caused by a germ called a virus. Symptoms can include runny nose, coughing, sneezing, fatigue, body aches, sore throat, fever, or headache.     A copy of your chest x-ray and lab results are below.    We have sent a prescription to your pharmacy for two different antibiotics. Please take these as prescribed. You may take Tylenol up to 650 mg every 6 hours as needed for pain and/or Motrin up to 600 mg every 8 hours as needed for pain.    We recommend follow-up with your primary care doctor in the next 5-7 days. Please also make an appointment with your kidney doctor to go over blood test results.     SEEK IMMEDIATE MEDICAL CARE IF YOU HAVE ANY OF THE FOLLOWING SYMPTOMS: shortness of breath, chest pain, fever over 10 days, or lightheadedness/dizziness.

## 2023-09-24 NOTE — ED PROVIDER NOTE - CLINICAL SUMMARY MEDICAL DECISION MAKING FREE TEXT BOX
69 y/o M with PMHx of DM, MS and renal CA s/p bilateral partial nephrectomy (in remission, no active treatment) presents to ED c/o congestion, dry cough and chest congestion x 2 days. Also endorsing an episode of loose stool, mild headache and post nasal drip. Confirmed positive home COVID19 test and multiple sick contacts at home with same symptoms. Patient is well appearing, no evidence of respiratory distress, vital signs are stable, no hypoxia and afebrile. Low clinical suspicion for overlying PNA, though given comorbidities, will check CXR. If negative, will discuss supportive management with patient as well as return precautions.

## 2023-09-24 NOTE — ED ADULT NURSE NOTE - NSFALLUNIVINTERV_ED_ALL_ED
Bed/Stretcher in lowest position, wheels locked, appropriate side rails in place/Call bell, personal items and telephone in reach/Instruct patient to call for assistance before getting out of bed/chair/stretcher/Non-slip footwear applied when patient is off stretcher/[place] to call system/Physically safe environment - no spills, clutter or unnecessary equipment/Purposeful proactive rounding/Room/bathroom lighting operational, light cord in reach

## 2023-09-24 NOTE — ED PROVIDER NOTE - ATTENDING CONTRIBUTION TO CARE
DR. BAEZ, ATTENDING MD-  I performed a face to face bedside interview with the patient regarding history of present illness, review of symptoms and past medical history. I completed an independent physical exam.  I have discussed the patient's plan of care with the resident.   Documentation as above in the note.    67 y/o male h/o ms partial b/l nephrectomies for renal ca in remission here with c/o cough congestion x2 days.  Mult sick contacts at home.  Tested positive for covid 2 days ago.  Here in ED to ensure he does not have bacterial pna.  Well appearing, easy wob, ctabil.  Obtain cxr likely dc with quarantine precautions, pcp f/u.

## 2023-09-24 NOTE — ED PROVIDER NOTE - PROGRESS NOTE DETAILS
CXR with evidence of L lung haziness, infection cannot be excluded. Will check basic labs, give IV abx and re-vital patient.

## 2023-09-24 NOTE — ED PROVIDER NOTE - PATIENT PORTAL LINK FT
You can access the FollowMyHealth Patient Portal offered by [facility] by registering at the following website: [email or web address]. By joining [employer]’s FollowMyHealth portal, you will also be able to view your health information using other applications (apps) compatible with our system.

## 2023-09-24 NOTE — ED PROVIDER NOTE - NSICDXPASTMEDICALHX_GEN_ALL_CORE_FT
PAST MEDICAL HISTORY:  Hyperlipidemia     Hypertension     Multiple sclerosis     Renal cell carcinoma of both kidneys     Scleritis, unspecified laterality

## 2023-09-25 ENCOUNTER — TRANSCRIPTION ENCOUNTER (OUTPATIENT)
Age: 68
End: 2023-09-25

## 2023-09-25 NOTE — ED POST DISCHARGE NOTE - ADDITIONAL DOCUMENTATION
9/26- pt feels better, continues to take antibiotics, will followup with pmd and will return for any worsening symptoms

## 2023-09-25 NOTE — ED POST DISCHARGE NOTE - REASON FOR FOLLOW-UP
Other Pt contacted ED to confirmed discharge prescriptions.  Pharmacy gave patient two different prescriptions for Azithromycin: 4 tablets and 8 tablets.  Discussed with patient to take Azithromycin 250mg daily for 4 days.

## 2023-10-13 ENCOUNTER — RX RENEWAL (OUTPATIENT)
Age: 68
End: 2023-10-13

## 2023-10-22 ENCOUNTER — EMERGENCY (EMERGENCY)
Facility: HOSPITAL | Age: 68
LOS: 1 days | Discharge: HOME | End: 2023-10-22
Attending: EMERGENCY MEDICINE | Admitting: EMERGENCY MEDICINE
Payer: MEDICARE

## 2023-10-22 VITALS
OXYGEN SATURATION: 100 % | SYSTOLIC BLOOD PRESSURE: 145 MMHG | HEART RATE: 66 BPM | DIASTOLIC BLOOD PRESSURE: 88 MMHG | RESPIRATION RATE: 15 BRPM | TEMPERATURE: 98 F

## 2023-10-22 DIAGNOSIS — Z85.528 PERSONAL HISTORY OF OTHER MALIGNANT NEOPLASM OF KIDNEY: Chronic | ICD-10-CM

## 2023-10-22 PROCEDURE — 71046 X-RAY EXAM CHEST 2 VIEWS: CPT | Mod: 26

## 2023-10-22 PROCEDURE — 99284 EMERGENCY DEPT VISIT MOD MDM: CPT

## 2023-10-22 NOTE — ED ADULT NURSE NOTE - NSFALLHARMRISKINTERV_ED_ALL_ED

## 2023-10-22 NOTE — ED PROVIDER NOTE - OBJECTIVE STATEMENT
68 M complaining of diagnosis of pneumonia nearly 1 month ago.  Patient presented to ED with cough, fever, phlegm, weakness had a chest x-ray and was told had left lower lobe pneumonia.  Patient with history of partial nephrectomy, MS, use of immunosuppressants and steroids.  Patient was placed on antibiotics and had positive COVID test at that time.  Finished antibiotics and now has no fever and improved chest symptoms and scant cough.  Returns to ED for repeat chest x-ray per PMD, and was told to resume his MS medications if improved.  No current complaints of fever, ambulating easily, has mild lower extremity edema, unchanged from previous.

## 2023-10-22 NOTE — ED ADULT NURSE NOTE - NSSUHOSCREENINGYN_ED_ALL_ED
range of motion is not limited and there is no muscle tenderness. ambulating Yes - the patient is able to be screened

## 2023-10-22 NOTE — ED ADULT TRIAGE NOTE - BP NONINVASIVE DIASTOLIC (MM HG)
88 Mauc Instructions: By selecting yes to the question below the MAUC number will be added into the note.  This will be calculated automatically based on the diagnosis chosen, the size entered, the body zone selected (H,M,L) and the specific indications you chose. You will also have the option to override the Mohs AUC if you disagree with the automatically calculated number and this option is found in the Case Summary tab.

## 2023-10-22 NOTE — ED PROVIDER NOTE - PATIENT PORTAL LINK FT
You can access the FollowMyHealth Patient Portal offered by [facility] by registering at the following website: [email or web address]. By joining [employer]’s FollowMyHealth portal, you will also be able to view your health information using other applications (apps) compatible with our system.

## 2023-10-22 NOTE — ED ADULT TRIAGE NOTE - CHIEF COMPLAINT QUOTE
Pt coming in for check up s/p having covid 19 and pneumonia x1 month ago. States wants to make sure lungs are clear. Pt has no medical complaints at this time. Hx. MS. DM2 (no insulin pump). HTN. Pt well appearing.

## 2023-10-22 NOTE — ED PROVIDER NOTE - NSFOLLOWUPINSTRUCTIONS_ED_ALL_ED_FT
You were evaluated in the emergency department for cough, 1 month after COVID-pneumonia.  Your chest x-ray in the emergency department was improved from previous.  You had a normal oxygen level and vitals in the emergency department.  You can take over-the-counter cough medicine as required and should resume your prescribed medications for MS.  Follow-up with your doctor or return to emergency for worsening pain, fever, weakness, numbness, shortness of breath or any signs of distress.

## 2023-10-22 NOTE — ED PROVIDER NOTE - CLINICAL SUMMARY MEDICAL DECISION MAKING FREE TEXT BOX
68 M 1 month after diagnosis of COVID and pneumonia, finished antibiotic course, now returns for mild persistent but improving cough.  Repeat chest x-ray as per PMD advised, patient advised that radiologic findings can lag behind clinical symptoms and patient is nearly asymptomatic with rare coughing and no shortness of breath.  Patient advised that he should resume his MS medications but can call his neurologist as well to obtain advice.  Likely resolving COVID-pneumonia, minimal symptoms in ED and will suggest outpatient follow-up.

## 2023-10-22 NOTE — ED ADULT NURSE NOTE - OBJECTIVE STATEMENT
Patient received in ED intake room 9. A&Ox4 and ambulatory with cane at baseline. Coming in to ED for check up after diagnosed with covid 1 month ago. Offers no complaints at this time, denies CP, SOB, nausea, vomiting, headache, lightheadedness, dizziness, fever or chills. Well-appearing sitting up in stretcher. HOB elevated. Airway patent, speaking in full and complete sentences. No acute distress noted. Lungs clear with equal chest rise bilaterally. Wife (also pt) at bedside. Bed in lowest position, fall precautions in effect, wheels locked, appropriate side rails up, safety maintained. Awaiting MD orders.

## 2023-12-27 NOTE — ED CDU PROVIDER DISPOSITION NOTE - CLINICAL COURSE
Detail Level: Detailed Dr. Ramirez: 62M h/o HTN, HLD, RCC s/p resection, MS, placed in cdu for dec appetite, nausea, vomiting and diarrhea. Pt had dc'ed his [facility] after d/w Dr. Sanchez. Today, post IVF, pt feels much better, no nausea/vomiting/diarrhea/abdo pain. MMM, hemoconcentration resolved. Will d/w Dr. Sanchez regarding his hypoglycemics and then dc home. Pt stable for dc. Dr. Ramirez: 62M h/o HTN, HLD, RCC s/p resection, MS, placed in cdu for dec appetite, nausea, vomiting and diarrhea. Pt had dc'ed his [facility] after d/w Dr. Sanchez due to N/V/D dec po intake. Today, post IVF, pt feels much better, no nausea/vomiting/diarrhea/abdo pain. MMM, hemoconcentration resolved. Will d/w Dr. Sanchez regarding his hypoglycemics and then dc home. Pt stable for dc. Dr. Ramirez: 62M h/o HTN, HLD, RCC s/p resection, MS, placed in cdu for dec appetite, nausea, vomiting and diarrhea. Pt had dc'ed his [facility] after d/w Dr. Sanchez due to N/V/D dec po intake. Today, post IVF, pt feels much better, no nausea/vomiting/diarrhea/abdo pain. MMM, hemoconcentration resolved. Will d/w Dr. Sanchez regarding his hypoglycemics and then dc home. Pt stable for dc.    LEXII Holloway- Pt feeling better after ER stay, to be dc home not taking DM meds as per endocrine Dr. Sanchez. Pt to f/u as outpatient. stable for dc

## 2024-01-02 ENCOUNTER — RX RENEWAL (OUTPATIENT)
Age: 69
End: 2024-01-02

## 2024-01-10 ENCOUNTER — NON-APPOINTMENT (OUTPATIENT)
Age: 69
End: 2024-01-10

## 2024-01-10 ENCOUNTER — APPOINTMENT (OUTPATIENT)
Dept: ENDOCRINOLOGY | Facility: CLINIC | Age: 69
End: 2024-01-10
Payer: MEDICARE

## 2024-01-10 VITALS
BODY MASS INDEX: 33.65 KG/M2 | WEIGHT: 222 LBS | SYSTOLIC BLOOD PRESSURE: 110 MMHG | OXYGEN SATURATION: 99 % | HEIGHT: 68 IN | HEART RATE: 79 BPM | DIASTOLIC BLOOD PRESSURE: 70 MMHG

## 2024-01-10 LAB
GLUCOSE BLDC GLUCOMTR-MCNC: 148
HBA1C MFR BLD HPLC: 6.8

## 2024-01-10 PROCEDURE — 83036 HEMOGLOBIN GLYCOSYLATED A1C: CPT | Mod: QW

## 2024-01-10 PROCEDURE — 99215 OFFICE O/P EST HI 40 MIN: CPT | Mod: 25

## 2024-01-10 PROCEDURE — G2211 COMPLEX E/M VISIT ADD ON: CPT

## 2024-01-10 PROCEDURE — 82962 GLUCOSE BLOOD TEST: CPT

## 2024-01-10 RX ORDER — INSULIN DEGLUDEC INJECTION 200 U/ML
200 INJECTION, SOLUTION SUBCUTANEOUS
Qty: 3 | Refills: 3 | Status: DISCONTINUED | COMMUNITY
Start: 2023-05-31 | End: 2024-01-10

## 2024-01-10 RX ORDER — DULOXETINE HCL 100 %
POWDER (GRAM) MISCELLANEOUS
Refills: 0 | Status: ACTIVE | COMMUNITY

## 2024-01-10 RX ORDER — INSULIN GLARGINE 100 [IU]/ML
100 INJECTION, SOLUTION SUBCUTANEOUS
Qty: 4 | Refills: 1 | Status: DISCONTINUED | COMMUNITY
Start: 2022-01-19 | End: 2024-01-10

## 2024-01-10 NOTE — HISTORY OF PRESENT ILLNESS
[FreeTextEntry1] : 68M here for follow up of DM2. Also with MS with prior but not recent steroid use. Many issues with colorectal pain, following up with specialist  DM2 - He has been managed on basal bolus insulin + Jardiance. -HbA1c 6.8% today Tried Theodora 2 was alarming for hypoglycemia inaccurate, stopped using it after 2 days. Was going to restart it but has not done so yet due to having multiple radiologic tests. Checking POCT FS 4 x daily.  Current regimen: - Tresiba 54 units qhs - Humalog 8/8/10  - Jardiance 25mg daily -- denies recent UTI's or yeast infections  glucometer log reviewed -174 prelunch 125, 155 predinner  bed 124-163  Some issues with cost of meds but spreads out refilling the scripts (not running out of meds) Previous intolerance to metformin (diarrhea) and GLP-1 (severe abd pain, nausea).  Denies hypoglycemic events Denies polyuria, polydipsia, or blurry vision Weight stable  Diet: states he stays up late at night 2/2 pain from colon/rectal issues, snacks at night Breakfast: cereal or eggs with toast and silva  Lunch: often skips, if eating: tuna fish or BLT, or fruit Dinner: fish or beef with vegetables with half baked potato Snacks: usually after dinner, around 11pm-12am: crackers with cheese and herbal tea, occasional devil dog dessert cakes  Up to date with ophtho, no retinopathy, is making a follow up appointment soon  Has numbness/tingling from waist down and feet, reports chronic MS symptoms Podiatry: does not see podiatry, denies active issues with feet  + Microalbuminuria, on Jardiance. Previously on Kerendia which was stopped. Follows nephrology.   Acquired hypothyroidism on levothyroxine 112 mcg. Reports taking this mid day with other meds. Has been taking it this way long term. Denies: increased fatigue, constipation or diarrhea, cold or heat intolerance, palpations, weight changes, or tremors.   Vitamin D deficiency on ergocalciferol 50,000u 2 x per month  He is off Olmesartan since BP was lower side in the past. Was restarted on Losartan 25mg. Bumetanide added for LE swelling Taking niacin 500mg daily HLD: taking atorvastatin 10mg  Prior issue with rectal fissures/pain required procedures now resolved. However, continues to have rectal spasms that keeps him up at night.  He followed up at [facility] and found out he has had recurrence of cancer lesions on his kidneys (history of partial bilateral nephrectomy retained 85% of left and retained 90% on the right). He was told it will just be monitored for now. Has an appointment in 1 month.  Following with nephrology Dr. Amaya.

## 2024-01-10 NOTE — PHYSICAL EXAM
[Alert] : alert [No Acute Distress] : no acute distress [Well Developed] : well developed [Normal Sclera/Conjunctiva] : normal sclera/conjunctiva [No Proptosis] : no proptosis [No Neck Mass] : no neck mass was observed [Supple] : the neck was supple [Thyroid Not Enlarged] : the thyroid was not enlarged [No Thyroid Nodules] : no palpable thyroid nodules [No Respiratory Distress] : no respiratory distress [No Accessory Muscle Use] : no accessory muscle use [Clear to Auscultation] : lungs were clear to auscultation bilaterally [Normal S1, S2] : normal S1 and S2 [Normal Rate] : heart rate was normal [Regular Rhythm] : with a regular rhythm [Not Tender] : non-tender [Not Distended] : not distended [Soft] : abdomen soft [Right foot was examined, including] : right foot ~C was examined, including visual inspection with sensory and pulse exams [Left foot was examined, including] : left foot ~C was examined, including visual inspection with sensory and pulse exams [Normal] : normal [Full ROM] : with full range of motion [No Tremors] : no tremors [Oriented x3] : oriented to person, place, and time [Normal Affect] : the affect was normal [Normal Mood] : the mood was normal [Foot Ulcers] : no foot ulcers [Diminished Throughout Both Feet] : diminished tactile sensation with monofilament testing throughout both feet [de-identified] : trace b/l LE edema  [de-identified] : mildly diminished sensation b/l with monofilament

## 2024-01-10 NOTE — ASSESSMENT
[FreeTextEntry1] : 68M DM2, hypothyroidism, HTN, HLD vit D deficiency presents for follow up.  1) DM2 with microalbuminuria HbA1c 6.8% improved and at goal range. -Glucose has been trending lower recently since using Tresiba and also now eating dinner earlier 5-6pm. -Continue Tresiba 54 units qhs (pharmacy called not covered will switch to Toujeo at same dose) - Continue Humalog 8 units before breakfast and reduce to 8 units before lunch, continue dinner Humalog 10 units. - Continue Jardiance 25mg daily (reviewed hold x 3 days before procedure/surgery) - f/u ophtho, podiatry, nephrology - Received Libre2, has not started using it, has MRI x 2 coming up soon. Encouraged to use Theodora. - Repeat labs today  - Patient requires a therapeutic CGM - Patient has diabetes mellitus - Patient has been using a home blood glucose monitor and performing frequent (four times a day) testing, 6 months - Patient is being insulin-treated with multiple daily injections (MDI) of insulin x4day, x 6months or a continuous subcutaneous insulin infusion (CSII) pump [id number] - Patient insulin treatment regimen requires frequent adjustments by the Patient on the basis of therapeutic CGM testing results.   2) Hypothyroidism - Check TFTs - continue levothyroxine 112 mcg daily  3) Vit D deficiency - on ergocalciferol 50,000u 2x per month - 25OHD 36 at goal - Will repeat level  4) microalbuminuria - Following with nephrology Dr. Amaya. - Microalbumin/cr 46 in July 2021. Recommend continue Jardiance for renal protection. - Resumed on Losartan 25mg Aug 2022. Using intermittently (holds if SBP < 120) - Previously on Kerendia which was stopped. Follows nephrology. -Check alb/creat today  5) HLD with elevated triglycerides - continue niacin and recommend lower carb diet. - Continue atorvastatin 10mg - Will recheck lipid profile  Follow up 3 months.

## 2024-01-10 NOTE — REVIEW OF SYSTEMS
[As Noted in HPI] : as noted in HPI [Negative] : Psychiatric [Fatigue] : no fatigue [Recent Weight Gain (___ Lbs)] : no recent weight gain [Recent Weight Loss (___ Lbs)] : no recent weight loss [Blurred Vision] : no blurred vision [Dysphagia] : no dysphagia [Neck Pain] : no neck pain [Dysphonia] : no dysphonia [Chest Pain] : no chest pain [Palpitations] : no palpitations [Shortness Of Breath] : no shortness of breath [Nausea] : no nausea [Abdominal Pain] : no abdominal pain [Vomiting] : no vomiting [Polyuria] : no polyuria [Tremors] : no tremors [Polydipsia] : no polydipsia [Cold Intolerance] : no cold intolerance [Heat Intolerance] : no heat intolerance [de-identified] : tingling lower extremities related to MS

## 2024-01-11 LAB
CREAT SPEC-SCNC: 145 MG/DL
MICROALBUMIN 24H UR DL<=1MG/L-MCNC: 4.2 MG/DL
MICROALBUMIN/CREAT 24H UR-RTO: 29 MG/G

## 2024-01-17 LAB — HBA1C MFR BLD HPLC: 7.8

## 2024-02-05 ENCOUNTER — RX RENEWAL (OUTPATIENT)
Age: 69
End: 2024-02-05

## 2024-02-14 ENCOUNTER — RX RENEWAL (OUTPATIENT)
Age: 69
End: 2024-02-14

## 2024-02-14 ENCOUNTER — APPOINTMENT (OUTPATIENT)
Dept: PAIN MANAGEMENT | Facility: CLINIC | Age: 69
End: 2024-02-14

## 2024-02-14 RX ORDER — EMPAGLIFLOZIN 25 MG/1
25 TABLET, FILM COATED ORAL
Qty: 90 | Refills: 3 | Status: ACTIVE | COMMUNITY
Start: 2019-09-20 | End: 1900-01-01

## 2024-03-29 ENCOUNTER — RX RENEWAL (OUTPATIENT)
Age: 69
End: 2024-03-29

## 2024-05-08 RX ORDER — INSULIN DEGLUDEC INJECTION 100 U/ML
100 INJECTION, SOLUTION SUBCUTANEOUS
Qty: 4 | Refills: 3 | Status: ACTIVE | COMMUNITY
Start: 2024-01-10 | End: 1900-01-01

## 2024-05-08 RX ORDER — INSULIN GLARGINE 300 U/ML
300 INJECTION, SOLUTION SUBCUTANEOUS
Qty: 3 | Refills: 1 | Status: DISCONTINUED | COMMUNITY
Start: 2024-01-10 | End: 2024-05-08

## 2024-05-29 ENCOUNTER — APPOINTMENT (OUTPATIENT)
Dept: ENDOCRINOLOGY | Facility: CLINIC | Age: 69
End: 2024-05-29
Payer: MEDICARE

## 2024-05-29 VITALS
BODY MASS INDEX: 33.34 KG/M2 | DIASTOLIC BLOOD PRESSURE: 72 MMHG | SYSTOLIC BLOOD PRESSURE: 140 MMHG | OXYGEN SATURATION: 98 % | HEART RATE: 80 BPM | HEIGHT: 68 IN | WEIGHT: 220 LBS

## 2024-05-29 DIAGNOSIS — E78.5 HYPERLIPIDEMIA, UNSPECIFIED: ICD-10-CM

## 2024-05-29 DIAGNOSIS — R80.9 TYPE 2 DIABETES MELLITUS WITH OTHER DIABETIC KIDNEY COMPLICATION: ICD-10-CM

## 2024-05-29 DIAGNOSIS — E55.9 VITAMIN D DEFICIENCY, UNSPECIFIED: ICD-10-CM

## 2024-05-29 DIAGNOSIS — Z79.4 TYPE 2 DIABETES MELLITUS WITH OTHER DIABETIC KIDNEY COMPLICATION: ICD-10-CM

## 2024-05-29 DIAGNOSIS — R80.9 PROTEINURIA, UNSPECIFIED: ICD-10-CM

## 2024-05-29 DIAGNOSIS — E03.9 HYPOTHYROIDISM, UNSPECIFIED: ICD-10-CM

## 2024-05-29 DIAGNOSIS — E11.29 TYPE 2 DIABETES MELLITUS WITH OTHER DIABETIC KIDNEY COMPLICATION: ICD-10-CM

## 2024-05-29 LAB — HBA1C MFR BLD HPLC: 7.2

## 2024-05-29 PROCEDURE — 83036 HEMOGLOBIN GLYCOSYLATED A1C: CPT | Mod: QW

## 2024-05-29 PROCEDURE — G2211 COMPLEX E/M VISIT ADD ON: CPT

## 2024-05-29 PROCEDURE — 99215 OFFICE O/P EST HI 40 MIN: CPT

## 2024-05-29 RX ORDER — INSULIN LISPRO 100 [IU]/ML
100 INJECTION, SOLUTION INTRAVENOUS; SUBCUTANEOUS
Qty: 2 | Refills: 3 | Status: ACTIVE | COMMUNITY
Start: 2022-03-09 | End: 1900-01-01

## 2024-05-29 RX ORDER — ATORVASTATIN CALCIUM 10 MG/1
10 TABLET, FILM COATED ORAL
Qty: 90 | Refills: 3 | Status: ACTIVE | COMMUNITY
Start: 2022-05-05 | End: 1900-01-01

## 2024-05-29 RX ORDER — INSULIN ASPART 100 [IU]/ML
100 INJECTION, SOLUTION INTRAVENOUS; SUBCUTANEOUS
Qty: 2 | Refills: 3 | Status: DISCONTINUED | COMMUNITY
Start: 2023-05-31 | End: 2024-05-29

## 2024-05-29 NOTE — REVIEW OF SYSTEMS
[As Noted in HPI] : as noted in HPI [Negative] : Psychiatric [Fatigue] : no fatigue [Recent Weight Gain (___ Lbs)] : no recent weight gain [Recent Weight Loss (___ Lbs)] : no recent weight loss [Blurred Vision] : no blurred vision [Dysphagia] : no dysphagia [Neck Pain] : no neck pain [Dysphonia] : no dysphonia [Chest Pain] : no chest pain [Palpitations] : no palpitations [Shortness Of Breath] : no shortness of breath [Nausea] : no nausea [Abdominal Pain] : no abdominal pain [Vomiting] : no vomiting [Polyuria] : no polyuria [Tremors] : no tremors [Polydipsia] : no polydipsia [Cold Intolerance] : no cold intolerance [Heat Intolerance] : no heat intolerance [de-identified] : tingling lower extremities related to MS

## 2024-05-29 NOTE — HISTORY OF PRESENT ILLNESS
[FreeTextEntry1] : 68M here for follow up of DM2. Also with MS with prior but not recent steroid use. Many issues with colorectal pain, following up with specialist  DM2 - He has been managed on basal bolus insulin + Jardiance. -HbA1c 7.2% today Had run out of Tresiba x 3 weeks due to pharmacy issue, now resumed back on it for last 2 weeks. Tried Theodora 2 was alarming for hypoglycemia inaccurate, stopped using it after 2 days. Was going to restart it but has not done so yet due to having multiple radiologic tests. Prefers not to use CGM does not like having something on him. Checking POCT FS 4 x daily.  Current regimen: - Tresiba 54 units qhs - Humalog 8/8/10  - Jardiance 25mg daily -- denies recent UTI's or yeast infections  glucometer log reviewed -198 prelunch 112-181 predinner  bed 114-155  Some issues with cost of meds but spreads out refilling the scripts (not running out of meds) Previous intolerance to metformin (diarrhea) and GLP-1 (severe abd pain, nausea).  Denies hypoglycemic events Denies polyuria, polydipsia, or blurry vision Weight stable  Up to date with ophtho, no retinopathy, brings report from recent visit. Follow up 12 months. Has numbness/tingling from waist down and feet, reports chronic MS symptoms Podiatry: does not see podiatry, denies active issues with feet  + Microalbuminuria, on Jardiance. Previously on Kerendia which was stopped. Follows nephrology.   Acquired hypothyroidism on levothyroxine 112 mcg. Reports taking this mid day with other meds. Has been taking it this way long term. Denies: increased fatigue, constipation or diarrhea, cold or heat intolerance, palpations, weight changes, or tremors.   Vitamin D deficiency on ergocalciferol 50,000u 2 x per month  He is off Olmesartan since BP was lower side in the past. Was restarted on Losartan 25mg. Bumetanide added for LE swelling Taking niacin 500mg daily HLD: taking atorvastatin 10mg  Prior issue with rectal fissures/pain required procedures now resolved. However, continues to have rectal spasms that keeps him up at night.  He followed up at [facility] and found out he has had recurrence of cancer lesions on his kidneys (history of partial bilateral nephrectomy retained 85% of left and retained 90% on the right). He was told it will just be monitored for now. Has an appointment in 1 month.  Following with nephrology Dr. Amaya.

## 2024-05-29 NOTE — PHYSICAL EXAM
[Alert] : alert [No Acute Distress] : no acute distress [Well Developed] : well developed [Normal Sclera/Conjunctiva] : normal sclera/conjunctiva [No Proptosis] : no proptosis [No Neck Mass] : no neck mass was observed [Supple] : the neck was supple [Thyroid Not Enlarged] : the thyroid was not enlarged [No Thyroid Nodules] : no palpable thyroid nodules [No Respiratory Distress] : no respiratory distress [No Accessory Muscle Use] : no accessory muscle use [Clear to Auscultation] : lungs were clear to auscultation bilaterally [Normal S1, S2] : normal S1 and S2 [Normal Rate] : heart rate was normal [Regular Rhythm] : with a regular rhythm [Not Tender] : non-tender [Not Distended] : not distended [Soft] : abdomen soft [Right foot was examined, including] : right foot ~C was examined, including visual inspection with sensory and pulse exams [Left foot was examined, including] : left foot ~C was examined, including visual inspection with sensory and pulse exams [Normal] : normal [Full ROM] : with full range of motion [Diminished Throughout Both Feet] : diminished tactile sensation with monofilament testing throughout both feet [No Tremors] : no tremors [Oriented x3] : oriented to person, place, and time [Normal Affect] : the affect was normal [Normal Mood] : the mood was normal [Foot Ulcers] : no foot ulcers [de-identified] : trace b/l LE edema  [de-identified] : mildly diminished sensation b/l with monofilament

## 2024-05-29 NOTE — ASSESSMENT
[FreeTextEntry1] : 68M DM2, hypothyroidism, HTN, HLD vit D deficiency presents for follow up.  1) DM2 with microalbuminuria HbA1c 7.2% reflecting 3 weeks off Tresiba (last visit was 6.8%). -Continue Tresiba 54 units qhs  - Continue Humalog 8 units before breakfast and reduce to 8 units before lunch, continue dinner Humalog 10 units. - Continue Jardiance 25mg daily (reviewed hold x 3 days before procedure/surgery) - f/u ophtho, podiatry, nephrology - Received Libre2, has not started using it, has MRI x 2 coming up soon. Encouraged to use Theodora but declines at this time. - Repeat labs today  - Patient requires a therapeutic CGM - Patient has diabetes mellitus - Patient has been using a home blood glucose monitor and performing frequent (four times a day) testing, 6 months - Patient is being insulin-treated with multiple daily injections (MDI) of insulin x4day, x 6months or a continuous subcutaneous insulin infusion (CSII) pump [id number] - Patient insulin treatment regimen requires frequent adjustments by the Patient on the basis of therapeutic CGM testing results.   2) Hypothyroidism - Check TFTs - continue levothyroxine 112 mcg daily  3) Vit D deficiency - on ergocalciferol 50,000u 2x per month - 25OHD 36 at goal - Will repeat level  4) microalbuminuria - Following with nephrology Dr. Amaya. - Microalbumin/cr improved to 29 Jan 2024.. Recommend continue Jardiance for renal protection. - Resumed on Losartan 25mg Aug 2022. Using intermittently (holds if SBP < 120) - Previously on Kerendia which was stopped. Follows nephrology.  5) HLD with elevated triglycerides - continue niacin and recommend lower carb diet. - Continue atorvastatin 10mg - Will recheck lipid profile  Follow up 3 months.

## 2024-05-30 LAB
25(OH)D3 SERPL-MCNC: 44.5 NG/ML
ALBUMIN SERPL ELPH-MCNC: 4.5 G/DL
ALP BLD-CCNC: 71 U/L
ALT SERPL-CCNC: 22 U/L
ANION GAP SERPL CALC-SCNC: 10 MMOL/L
AST SERPL-CCNC: 15 U/L
BILIRUB SERPL-MCNC: 0.5 MG/DL
BUN SERPL-MCNC: 19 MG/DL
CALCIUM SERPL-MCNC: 9.4 MG/DL
CHLORIDE SERPL-SCNC: 102 MMOL/L
CHOLEST SERPL-MCNC: 136 MG/DL
CO2 SERPL-SCNC: 27 MMOL/L
CREAT SERPL-MCNC: 1.48 MG/DL
EGFR: 52 ML/MIN/1.73M2
GLUCOSE SERPL-MCNC: 136 MG/DL
HDLC SERPL-MCNC: 33 MG/DL
LDLC SERPL CALC-MCNC: 81 MG/DL
NONHDLC SERPL-MCNC: 103 MG/DL
POTASSIUM SERPL-SCNC: 4.6 MMOL/L
PROT SERPL-MCNC: 7.4 G/DL
SODIUM SERPL-SCNC: 139 MMOL/L
T4 FREE SERPL-MCNC: 1.1 NG/DL
TRIGL SERPL-MCNC: 118 MG/DL
TSH SERPL-ACNC: 1.28 UIU/ML

## 2024-06-06 NOTE — ED ADULT NURSE NOTE - PT NEEDS ASSIST
Detail Level: Generalized Quality 47: Advance Care Plan: Advance Care Planning discussed and documented; advance care plan or surrogate decision maker documented in the medical record. Quality 226: Preventive Care And Screening: Tobacco Use: Screening And Cessation Intervention: Patient screened for tobacco use and is an ex/non-smoker yes

## 2024-08-28 ENCOUNTER — APPOINTMENT (OUTPATIENT)
Dept: ENDOCRINOLOGY | Facility: CLINIC | Age: 69
End: 2024-08-28
Payer: MEDICARE

## 2024-08-28 VITALS
WEIGHT: 223 LBS | BODY MASS INDEX: 33.8 KG/M2 | DIASTOLIC BLOOD PRESSURE: 70 MMHG | OXYGEN SATURATION: 98 % | SYSTOLIC BLOOD PRESSURE: 116 MMHG | HEART RATE: 78 BPM | HEIGHT: 68 IN

## 2024-08-28 DIAGNOSIS — R80.9 TYPE 2 DIABETES MELLITUS WITH OTHER DIABETIC KIDNEY COMPLICATION: ICD-10-CM

## 2024-08-28 DIAGNOSIS — E55.9 VITAMIN D DEFICIENCY, UNSPECIFIED: ICD-10-CM

## 2024-08-28 DIAGNOSIS — Z79.4 TYPE 2 DIABETES MELLITUS WITH OTHER DIABETIC KIDNEY COMPLICATION: ICD-10-CM

## 2024-08-28 DIAGNOSIS — E03.9 HYPOTHYROIDISM, UNSPECIFIED: ICD-10-CM

## 2024-08-28 DIAGNOSIS — E78.5 HYPERLIPIDEMIA, UNSPECIFIED: ICD-10-CM

## 2024-08-28 DIAGNOSIS — R80.9 PROTEINURIA, UNSPECIFIED: ICD-10-CM

## 2024-08-28 DIAGNOSIS — E11.29 TYPE 2 DIABETES MELLITUS WITH OTHER DIABETIC KIDNEY COMPLICATION: ICD-10-CM

## 2024-08-28 PROCEDURE — 99215 OFFICE O/P EST HI 40 MIN: CPT

## 2024-08-28 PROCEDURE — G2211 COMPLEX E/M VISIT ADD ON: CPT

## 2024-08-28 RX ORDER — TIRZEPATIDE 2.5 MG/.5ML
2.5 INJECTION, SOLUTION SUBCUTANEOUS
Qty: 1 | Refills: 1 | Status: ACTIVE | COMMUNITY
Start: 2024-08-28 | End: 1900-01-01

## 2024-08-28 NOTE — HISTORY OF PRESENT ILLNESS
[FreeTextEntry1] : 69M here for follow up of DM2. Also with MS with prior but not recent steroid use. Many issues with colorectal pain, following up with specialist  DM2 - He has been managed on basal bolus insulin + Jardiance. -HbA1c 7.3%  Tried Theodora 2 was alarming for hypoglycemia inaccurate, stopped using it after 2 days. Was going to restart it but has not done so yet due to having multiple radiologic tests. Prefers not to use CGM does not like having something on him. Checking POCT FS 4 x daily.  Current regimen: - Tresiba 54 units qhs - Humalog 8/8/10  - Jardiance 25mg daily -- denies recent UTI's or yeast infections  glucometer log reviewed -150s PM 87-97  Some issues with cost of meds but spreads out refilling the scripts (not running out of meds) Previous intolerance to metformin (diarrhea) and GLP-1 (severe abd pain, nausea).  Denies hypoglycemic events Denies polyuria, polydipsia, or blurry vision Weight stable  Up to date with ophtho, no retinopathy, brings report from recent visit. Follow up 12 months. Has numbness/tingling from waist down and feet, reports chronic MS symptoms Podiatry: does not see podiatry, denies active issues with feet  + Microalbuminuria, on Jardiance. Previously on Kerendia which was stopped. Follows nephrology.   Acquired hypothyroidism on levothyroxine 112 mcg. Reports taking this mid day with other meds. Has been taking it this way long term. Denies: increased fatigue, constipation or diarrhea, cold or heat intolerance, palpations, weight changes, or tremors.   Vitamin D deficiency on ergocalciferol 50,000u 2 x per month  He is off Olmesartan since BP was lower side in the past. Was restarted on Losartan 25mg. Bumetanide added for LE swelling Taking niacin 500mg daily HLD: taking atorvastatin 10mg  Prior issue with rectal fissures/pain required procedures now resolved. However, continues to have rectal spasms that keeps him up at night.  He followed up at [facility] and found out he has had recurrence of cancer lesions on his kidneys (history of partial bilateral nephrectomy retained 85% of left and retained 90% on the right). He was told it will just be monitored for now. Has an appointment in 1 month.  Following with nephrology Dr. Amaya.

## 2024-08-28 NOTE — PHYSICAL EXAM
[Alert] : alert [No Acute Distress] : no acute distress [Well Developed] : well developed [Normal Sclera/Conjunctiva] : normal sclera/conjunctiva [No Proptosis] : no proptosis [No Neck Mass] : no neck mass was observed [Supple] : the neck was supple [Thyroid Not Enlarged] : the thyroid was not enlarged [No Thyroid Nodules] : no palpable thyroid nodules [No Respiratory Distress] : no respiratory distress [No Accessory Muscle Use] : no accessory muscle use [Clear to Auscultation] : lungs were clear to auscultation bilaterally [Normal S1, S2] : normal S1 and S2 [Normal Rate] : heart rate was normal [Regular Rhythm] : with a regular rhythm [Not Tender] : non-tender [Not Distended] : not distended [Soft] : abdomen soft [Right foot was examined, including] : right foot ~C was examined, including visual inspection with sensory and pulse exams [Left foot was examined, including] : left foot ~C was examined, including visual inspection with sensory and pulse exams [Normal] : normal [Full ROM] : with full range of motion [Diminished Throughout Both Feet] : diminished tactile sensation with monofilament testing throughout both feet [No Tremors] : no tremors [Oriented x3] : oriented to person, place, and time [Normal Affect] : the affect was normal [Normal Mood] : the mood was normal [Foot Ulcers] : no foot ulcers [de-identified] : trace b/l LE edema  [de-identified] : mildly diminished sensation b/l with monofilament

## 2024-08-28 NOTE — REVIEW OF SYSTEMS
[As Noted in HPI] : as noted in HPI [Negative] : Psychiatric [Fatigue] : no fatigue [Recent Weight Gain (___ Lbs)] : no recent weight gain [Recent Weight Loss (___ Lbs)] : no recent weight loss [Blurred Vision] : no blurred vision [Dysphagia] : no dysphagia [Neck Pain] : no neck pain [Dysphonia] : no dysphonia [Chest Pain] : no chest pain [Palpitations] : no palpitations [Shortness Of Breath] : no shortness of breath [Nausea] : no nausea [Abdominal Pain] : no abdominal pain [Vomiting] : no vomiting [Polyuria] : no polyuria [Tremors] : no tremors [Polydipsia] : no polydipsia [Cold Intolerance] : no cold intolerance [Heat Intolerance] : no heat intolerance [de-identified] : tingling lower extremities related to MS

## 2024-10-21 RX ORDER — TIRZEPATIDE 5 MG/.5ML
5 INJECTION, SOLUTION SUBCUTANEOUS
Qty: 1 | Refills: 2 | Status: ACTIVE | COMMUNITY
Start: 2024-10-21 | End: 1900-01-01

## 2024-12-18 ENCOUNTER — APPOINTMENT (OUTPATIENT)
Dept: ENDOCRINOLOGY | Facility: CLINIC | Age: 69
End: 2024-12-18

## 2025-01-22 ENCOUNTER — APPOINTMENT (OUTPATIENT)
Dept: ENDOCRINOLOGY | Facility: CLINIC | Age: 70
End: 2025-01-22
Payer: MEDICARE

## 2025-01-22 VITALS
WEIGHT: 222 LBS | OXYGEN SATURATION: 98 % | HEART RATE: 87 BPM | BODY MASS INDEX: 33.76 KG/M2 | SYSTOLIC BLOOD PRESSURE: 132 MMHG | DIASTOLIC BLOOD PRESSURE: 72 MMHG

## 2025-01-22 DIAGNOSIS — E78.5 HYPERLIPIDEMIA, UNSPECIFIED: ICD-10-CM

## 2025-01-22 DIAGNOSIS — E03.9 HYPOTHYROIDISM, UNSPECIFIED: ICD-10-CM

## 2025-01-22 DIAGNOSIS — E11.29 TYPE 2 DIABETES MELLITUS WITH OTHER DIABETIC KIDNEY COMPLICATION: ICD-10-CM

## 2025-01-22 DIAGNOSIS — Z79.4 TYPE 2 DIABETES MELLITUS WITH OTHER DIABETIC KIDNEY COMPLICATION: ICD-10-CM

## 2025-01-22 DIAGNOSIS — E55.9 VITAMIN D DEFICIENCY, UNSPECIFIED: ICD-10-CM

## 2025-01-22 DIAGNOSIS — R80.9 PROTEINURIA, UNSPECIFIED: ICD-10-CM

## 2025-01-22 DIAGNOSIS — R80.9 TYPE 2 DIABETES MELLITUS WITH OTHER DIABETIC KIDNEY COMPLICATION: ICD-10-CM

## 2025-01-22 PROCEDURE — 99214 OFFICE O/P EST MOD 30 MIN: CPT

## 2025-01-22 PROCEDURE — G2211 COMPLEX E/M VISIT ADD ON: CPT

## 2025-02-12 NOTE — ED PROVIDER NOTE - CROS ED CARDIOVAS ALL NEG
Pt received semi-supine in bed, with all lines intact, NAD, all precautions maintained. HR: 88
- - -

## 2025-03-13 ENCOUNTER — RX RENEWAL (OUTPATIENT)
Age: 70
End: 2025-03-13

## 2025-04-23 ENCOUNTER — APPOINTMENT (OUTPATIENT)
Dept: ENDOCRINOLOGY | Facility: CLINIC | Age: 70
End: 2025-04-23
Payer: MEDICARE

## 2025-04-23 VITALS
SYSTOLIC BLOOD PRESSURE: 126 MMHG | HEIGHT: 68 IN | WEIGHT: 214 LBS | HEART RATE: 86 BPM | BODY MASS INDEX: 32.43 KG/M2 | DIASTOLIC BLOOD PRESSURE: 70 MMHG | OXYGEN SATURATION: 98 %

## 2025-04-23 DIAGNOSIS — R80.9 TYPE 2 DIABETES MELLITUS WITH OTHER DIABETIC KIDNEY COMPLICATION: ICD-10-CM

## 2025-04-23 DIAGNOSIS — E11.29 TYPE 2 DIABETES MELLITUS WITH OTHER DIABETIC KIDNEY COMPLICATION: ICD-10-CM

## 2025-04-23 DIAGNOSIS — R80.9 PROTEINURIA, UNSPECIFIED: ICD-10-CM

## 2025-04-23 DIAGNOSIS — E03.9 HYPOTHYROIDISM, UNSPECIFIED: ICD-10-CM

## 2025-04-23 DIAGNOSIS — Z79.4 TYPE 2 DIABETES MELLITUS WITH OTHER DIABETIC KIDNEY COMPLICATION: ICD-10-CM

## 2025-04-23 DIAGNOSIS — E78.5 HYPERLIPIDEMIA, UNSPECIFIED: ICD-10-CM

## 2025-04-23 DIAGNOSIS — E55.9 VITAMIN D DEFICIENCY, UNSPECIFIED: ICD-10-CM

## 2025-04-23 LAB — HBA1C MFR BLD HPLC: 5.3

## 2025-04-23 PROCEDURE — 83036 HEMOGLOBIN GLYCOSYLATED A1C: CPT | Mod: QW

## 2025-04-23 PROCEDURE — 99214 OFFICE O/P EST MOD 30 MIN: CPT

## 2025-04-23 PROCEDURE — G2211 COMPLEX E/M VISIT ADD ON: CPT

## 2025-07-23 ENCOUNTER — APPOINTMENT (OUTPATIENT)
Dept: ENDOCRINOLOGY | Facility: CLINIC | Age: 70
End: 2025-07-23
Payer: MEDICARE

## 2025-07-23 VITALS
WEIGHT: 212 LBS | SYSTOLIC BLOOD PRESSURE: 120 MMHG | OXYGEN SATURATION: 98 % | DIASTOLIC BLOOD PRESSURE: 72 MMHG | HEIGHT: 68 IN | BODY MASS INDEX: 32.13 KG/M2 | HEART RATE: 82 BPM

## 2025-07-23 DIAGNOSIS — R80.9 TYPE 2 DIABETES MELLITUS WITH OTHER DIABETIC KIDNEY COMPLICATION: ICD-10-CM

## 2025-07-23 DIAGNOSIS — E55.9 VITAMIN D DEFICIENCY, UNSPECIFIED: ICD-10-CM

## 2025-07-23 DIAGNOSIS — E11.29 TYPE 2 DIABETES MELLITUS WITH OTHER DIABETIC KIDNEY COMPLICATION: ICD-10-CM

## 2025-07-23 DIAGNOSIS — E03.9 HYPOTHYROIDISM, UNSPECIFIED: ICD-10-CM

## 2025-07-23 DIAGNOSIS — Z79.4 TYPE 2 DIABETES MELLITUS WITH OTHER DIABETIC KIDNEY COMPLICATION: ICD-10-CM

## 2025-07-23 DIAGNOSIS — R80.9 PROTEINURIA, UNSPECIFIED: ICD-10-CM

## 2025-07-23 DIAGNOSIS — E78.5 HYPERLIPIDEMIA, UNSPECIFIED: ICD-10-CM

## 2025-07-23 LAB — HBA1C MFR BLD HPLC: 5.3

## 2025-07-23 PROCEDURE — 83036 HEMOGLOBIN GLYCOSYLATED A1C: CPT | Mod: QW

## 2025-07-23 PROCEDURE — 99214 OFFICE O/P EST MOD 30 MIN: CPT

## 2025-07-23 PROCEDURE — G2211 COMPLEX E/M VISIT ADD ON: CPT

## 2025-07-25 LAB
ALBUMIN, RANDOM URINE: 4.3 MG/DL
CREAT SPEC-SCNC: 150 MG/DL
MICROALBUMIN/CREAT 24H UR-RTO: 28 MG/G

## 2025-07-29 ENCOUNTER — NON-APPOINTMENT (OUTPATIENT)
Age: 70
End: 2025-07-29